# Patient Record
Sex: FEMALE | Race: WHITE | HISPANIC OR LATINO | Employment: FULL TIME | URBAN - METROPOLITAN AREA
[De-identification: names, ages, dates, MRNs, and addresses within clinical notes are randomized per-mention and may not be internally consistent; named-entity substitution may affect disease eponyms.]

---

## 2019-05-01 ENCOUNTER — TELEPHONE (OUTPATIENT)
Dept: SCHEDULING | Facility: IMAGING CENTER | Age: 29
End: 2019-05-01

## 2019-05-14 ENCOUNTER — OFFICE VISIT (OUTPATIENT)
Dept: MEDICAL GROUP | Facility: MEDICAL CENTER | Age: 29
End: 2019-05-14
Payer: COMMERCIAL

## 2019-05-14 VITALS
WEIGHT: 139.11 LBS | TEMPERATURE: 98.5 F | DIASTOLIC BLOOD PRESSURE: 59 MMHG | BODY MASS INDEX: 28.04 KG/M2 | HEART RATE: 89 BPM | HEIGHT: 59 IN | OXYGEN SATURATION: 95 % | SYSTOLIC BLOOD PRESSURE: 96 MMHG

## 2019-05-14 DIAGNOSIS — Z30.09 FAMILY PLANNING: ICD-10-CM

## 2019-05-14 DIAGNOSIS — Z00.00 WELL ADULT EXAM: ICD-10-CM

## 2019-05-14 PROCEDURE — 99385 PREV VISIT NEW AGE 18-39: CPT | Performed by: FAMILY MEDICINE

## 2019-05-14 ASSESSMENT — PATIENT HEALTH QUESTIONNAIRE - PHQ9: CLINICAL INTERPRETATION OF PHQ2 SCORE: 0

## 2019-05-16 NOTE — PROGRESS NOTES
Subjective:     CC:   Chief Complaint   Patient presents with   • Establish Care     Discuss family planning, going to [place], concerns of [place]       HPI:   Jarret Vargas is a 29 y.o. female who presents for annual exam. She is feeling well and denies any complaints.  Patient recently moved here from [place].  Patient also has questions about traveling to [place] and she is trying to conceive, with concerns about Zika virus.    Ob-Gyn/ History:    Patient has GYN provider: no  /Para:  0/0  Last Pap Smear: Up-to-date, no history of abnormal Pap smears.  Gyn Surgery: No.  Birth Control: none  Periods: Regular  No significant bloating/fluid retention, pelvic pain, or dyspareunia. No vaginal discharge  Folate intake: Yes   Sexually activity: Yes  Dyspareunia: No    Health Maintenance  Diet: Good   Exercise: Not currently   Substance Abuse: N/A   Safe in relationship.  Yes  STI Screening: N/A   Immunizations:    Influenza: Season ended   HPV: Aged out    Tetanus: Due, however, plans to get pregnant so we will get Tdap during last trimester.     Seat belts, bike helmet, gun safety discussed.  Sun protection used.    Cancer screening  Breast cancer: No family history of breast cancer, uterine or ovarian cancer  Colorectal Cancer Screening: No family history of colon cancer.      She  has no past medical history on file.  She  has a past surgical history that includes dental extraction(s).    Family History   Problem Relation Age of Onset   • Hypertension Mother    • Arthritis Father    • No Known Problems Sister    • No Known Problems Brother    • Cancer Maternal Grandmother         prostate   • Heart Disease Maternal Grandmother    • Heart Disease Maternal Grandfather        Social History     Social History   • Marital status:      Spouse name: N/A   • Number of children: N/A   • Years of education: N/A     Occupational History   • Not on file.     Social History Main Topics   • Smoking status: Never  "Smoker   • Smokeless tobacco: Never Used   • Alcohol use Yes      Comment: few nights a week   • Drug use: No   • Sexual activity: Yes     Partners: Male      Comment: ,      Other Topics Concern   • Not on file     Social History Narrative   • No narrative on file       Patient Active Problem List    Diagnosis Date Noted   • Family planning 05/14/2019         Current Outpatient Prescriptions   Medication Sig Dispense Refill   • [id number] PO Take  by mouth.       No current facility-administered medications for this visit.      Allergies   Allergen Reactions   • Other Misc      Cats       Review of Systems   Constitutional: Negative for fever, chills and malaise/fatigue.   HENT: Negative for congestion, sore throat, headache.    Eyes: Negative for double vision   Respiratory: Negative for cough and shortness of breath.    Cardiovascular: chest pain or palpitations  Gastrointestinal: Negative for nausea, vomiting, abdominal pain and diarrhea.   Genitourinary: Negative for dysuria and hematuria.   Skin: Negative for rash.   Neurological: Negative for dizziness, focal weakness and headaches.   Endo/Heme/Allergies: Does not bruise/bleed easily.   Psychiatric/Behavioral: Negative for depression or anxiety    Objective:     BP (!) 96/59 (BP Location: Right arm, Patient Position: Sitting, BP Cuff Size: Adult)   Pulse 89   Temp 36.9 °C (98.5 °F) (Temporal)   Ht 1.499 m (4' 11\")   Wt 63.1 kg (139 lb 1.8 oz)   LMP 05/14/2019   SpO2 95%   Breastfeeding? No   BMI 28.10 kg/m²   Body mass index is 28.1 kg/m².  Wt Readings from Last 4 Encounters:   05/14/19 63.1 kg (139 lb 1.8 oz)       Physical Exam:  Constitutional: Well-developed and well-nourished. Not diaphoretic. No distress.   Skin: Skin is warm and dry. No rash noted.  Eyes: Conjunctivae and extraocular motions are normal. Pupils are equal, round, and reactive to light. No scleral icterus.   Ears:  External ears unremarkable. Tympanic " membranes clear and intact.  Nose: Nares patent. No discharge.   Mouth/Throat: Dentition is normal. Tongue normal. Oropharynx is clear and moist. Posterior pharynx without erythema or exudates.  Neck: Supple, trachea midline. Normal range of motion. No thyromegaly present. No lymphadenopathy--cervical or supraclavicular.  Cardiovascular: Regular rate and rhythm, S1 and S2 without murmur, rubs, or gallops.    Lungs: Normal inspiratory effort, CTA bilaterally, no wheezes/rhonchi/rales  Abdomen: Soft, non tender, and without distention. Active bowel sounds in all four quadrants. No rebound, guarding, masses or HSM.  Extremities: No cyanosis, clubbing, erythema, nor edema. Distal pulses intact and symmetric.   Musculoskeletal: Normal gait and station.  Neurological: No focal deficits  Psychiatric:  Behavior, mood, and affect are appropriate.    Assessment and Plan:     1. Well adult exam     2. Family planning         HCM:     Up-to-date on immunizations aside from Tdap, plans to get this when she is pregnant.  Patient counseled about skin care, diet, supplements, prenatal vitamins, safe sex and exercise.  We did discuss traveling to [place].  I advised to avoid getting pregnant for the next 3 months after returning from [place] if her or her  receive any mosquito bites.    Please note that this dictation was created using voice recognition software. I have made every reasonable attempt to correct obvious errors, but I expect that there are errors of grammar and possibly content that I did not discover before finalizing the note.

## 2020-02-19 ENCOUNTER — APPOINTMENT (OUTPATIENT)
Dept: OTHER | Facility: IMAGING CENTER | Age: 30
End: 2020-02-19
Payer: COMMERCIAL

## 2020-03-02 ENCOUNTER — OFFICE VISIT (OUTPATIENT)
Dept: MEDICAL GROUP | Facility: MEDICAL CENTER | Age: 30
End: 2020-03-02
Payer: COMMERCIAL

## 2020-03-02 VITALS
SYSTOLIC BLOOD PRESSURE: 102 MMHG | OXYGEN SATURATION: 95 % | WEIGHT: 149.4 LBS | HEART RATE: 111 BPM | HEIGHT: 59 IN | BODY MASS INDEX: 30.12 KG/M2 | DIASTOLIC BLOOD PRESSURE: 58 MMHG | TEMPERATURE: 97.4 F

## 2020-03-02 DIAGNOSIS — R05.9 COUGH: ICD-10-CM

## 2020-03-02 PROCEDURE — 99213 OFFICE O/P EST LOW 20 MIN: CPT | Performed by: FAMILY MEDICINE

## 2020-03-02 ASSESSMENT — PATIENT HEALTH QUESTIONNAIRE - PHQ9: CLINICAL INTERPRETATION OF PHQ2 SCORE: 0

## 2020-03-02 NOTE — LETTER
[facility]  Tina Monroe M.D.  [address]  [place] [address]  Fax: [phone]   Authorization for Release/Disclosure of   Protected Health Information   Name: JARRET NICOLE : 1990 SSN: [id number]   Address: [address]  [place] [address] Phone:    [phone] (home)    I authorize the entity listed below to release/disclose the PHI below to:   [facility]/Tina Monroe M.D. and Tina Monroe M.D.   Provider or Entity Name: [facility]      Address   City, State, Zip   Phone:      Fax:     Reason for request: continuity of care   Information to be released:    [  ] LAST COLONOSCOPY,  including any PATH REPORT and follow-up  [  ] LAST FIT/COLOGUARD RESULT [  ] LAST DEXA  [  ] LAST MAMMOGRAM  [xxxx  ] LAST PAP  [  ] LAST LABS [  ] RETINA EXAM REPORT  [  ] IMMUNIZATION RECORDS  [  ] Release all info      [  ] Check here and initial the line next to each item to release ALL health information INCLUDING  _____ Care and treatment for drug and / or alcohol abuse  _____ HIV testing, infection status, or AIDS  _____ Genetic Testing    DATES OF SERVICE OR TIME PERIOD TO BE DISCLOSED: _____________  I understand and acknowledge that:  * This Authorization may be revoked at any time by you in writing, except if your health information has already been used or disclosed.  * Your health information that will be used or disclosed as a result of you signing this authorization could be re-disclosed by the recipient. If this occurs, your re-disclosed health information may no longer be protected by State or Federal laws.  * You may refuse to sign this Authorization. Your refusal will not affect your ability to obtain treatment.  * This Authorization becomes effective upon signing and will  on (date) __________.      If no date is indicated, this Authorization will  one (1) year from the signature date.    Name: Jarret Nicole    Signature:   Date:     3/2/2020       PLEASE FAX REQUESTED RECORDS BACK TO: [phone]

## 2020-03-02 NOTE — ASSESSMENT & PLAN NOTE
New problem. Started 1 week ago.   Cough, congestion, sore throat, headachess,   No further body aches.   Currently 5 months pregnant.   + fetal movement.   No fevers.   Currently taking tylenol and benadryl and robitussin.   + diarrhea, no e

## 2020-03-02 NOTE — PROGRESS NOTES
"Subjective:     CC: The encounter diagnosis was Cough.    HPI: Patient is a 30 y.o. female established patient who presents today with concern regarding illness.  Patient is currently 5 months pregnant.      Cough  New problem. Started 1 week ago.   Cough, congestion, sore throat, headachess,   No further body aches.  Seems to be improving now.  Currently 5 months pregnant.   + fetal movement.   No fevers.   Currently taking tylenol, benadryl and robitussin which were all approved by her OB.  She did have significant diarrhea yesterday, no further diarrhea today.      History reviewed. No pertinent past medical history.    Social History     Tobacco Use   • Smoking status: Never Smoker   • Smokeless tobacco: Never Used   Substance Use Topics   • Alcohol use: Yes     Comment: few nights a week   • Drug use: No       Current Outpatient Medications Ordered in Epic   Medication Sig Dispense Refill   • [id number] PO Take  by mouth.       No current Epic-ordered facility-administered medications on file.        Allergies:  Other [facility]    Health Maintenance: Completed    ROS:  Pulm: no sob, no cough  CV: no chest pain, no palpitations        Objective:       Exam:  /58 (BP Location: Right arm, Patient Position: Sitting, BP Cuff Size: Adult long)   Pulse (!) 111   Temp 36.3 °C (97.4 °F) (Temporal)   Ht 1.499 m (4' 11\")   Wt 67.8 kg (149 lb 6.4 oz)   LMP 10/12/2019   SpO2 95%   BMI 30.18 kg/m²  Body mass index is 30.18 kg/m².      General: Normal appearing. No distress.  HEAD: NCAT  EYES: conjunctiva clear, lids without ptosis, pupils equal  and reactive to light  EARS: ears normal shape and contour, canals are clear bilaterally, TMs clear  MOUTH: oropharynx is without erythema, edema or exudates.   Neck: Supple without masses. Thyroid is not enlarged. Normal ROM  Pulmonary: Clear to ausculation.  Normal effort. No rales, ronchi, or wheezing.  Cardiovascular: Regular rate and rhythm, no murmur. No LE " edema  Neurologic: Grossly normal, no focal deficits  Lymph: No cervical or supraclavicular lymph nodes are palpable  Skin: Warm and dry.  No obvious lesions.  Musculoskeletal: Normal gait and station.   Psych: Normal mood and affect. Alert and oriented x3. Judgment and insight is normal.    Assessment & Plan:     30 y.o. female with the following -     1. Cough  New problem.  Likely viral URI.  Discussed consideration of testing for influenza, however, given that symptoms started at least 5 days ago there would be no role for antiviral treatment.  I advised supportive care and to continue current medications as were advised by her OB.  I also advised trying nasal saline rinses to help with her eustachian tube dysfunction.  Discussed return precautions.  Advised the importance of keeping hydrated.      No follow-ups on file.    Please note that this dictation was created using voice recognition software. I have made every reasonable attempt to correct obvious errors, but I expect that there are errors of grammar and possibly content that I did not discover before finalizing the note.

## 2021-01-21 ENCOUNTER — OFFICE VISIT (OUTPATIENT)
Dept: MEDICAL GROUP | Facility: PHYSICIAN GROUP | Age: 31
End: 2021-01-21
Payer: COMMERCIAL

## 2021-01-21 VITALS
OXYGEN SATURATION: 98 % | HEIGHT: 59 IN | SYSTOLIC BLOOD PRESSURE: 102 MMHG | HEART RATE: 62 BPM | WEIGHT: 142 LBS | DIASTOLIC BLOOD PRESSURE: 66 MMHG | BODY MASS INDEX: 28.63 KG/M2 | TEMPERATURE: 97.9 F

## 2021-01-21 DIAGNOSIS — Z23 NEED FOR VACCINATION: ICD-10-CM

## 2021-01-21 DIAGNOSIS — R53.82 CHRONIC FATIGUE: ICD-10-CM

## 2021-01-21 DIAGNOSIS — G62.9 NEUROPATHY: ICD-10-CM

## 2021-01-21 DIAGNOSIS — Z13.228 SCREENING FOR METABOLIC DISORDER: ICD-10-CM

## 2021-01-21 DIAGNOSIS — Z13.29 SCREENING FOR THYROID DISORDER: ICD-10-CM

## 2021-01-21 DIAGNOSIS — Z13.21 ENCOUNTER FOR VITAMIN DEFICIENCY SCREENING: ICD-10-CM

## 2021-01-21 DIAGNOSIS — E55.9 VITAMIN D DEFICIENCY: ICD-10-CM

## 2021-01-21 DIAGNOSIS — F41.8 POSTPARTUM ANXIETY: ICD-10-CM

## 2021-01-21 DIAGNOSIS — Z00.00 ROUTINE ADULT HEALTH MAINTENANCE: ICD-10-CM

## 2021-01-21 DIAGNOSIS — Z13.6 SCREENING FOR CARDIOVASCULAR CONDITION: ICD-10-CM

## 2021-01-21 PROBLEM — R05.9 COUGH: Status: RESOLVED | Noted: 2020-03-02 | Resolved: 2021-01-21

## 2021-01-21 PROBLEM — Z30.09 FAMILY PLANNING: Status: RESOLVED | Noted: 2019-05-14 | Resolved: 2021-01-21

## 2021-01-21 PROCEDURE — 90686 IIV4 VACC NO PRSV 0.5 ML IM: CPT | Performed by: NURSE PRACTITIONER

## 2021-01-21 PROCEDURE — 99214 OFFICE O/P EST MOD 30 MIN: CPT | Mod: 25 | Performed by: NURSE PRACTITIONER

## 2021-01-21 PROCEDURE — 90471 IMMUNIZATION ADMIN: CPT | Performed by: NURSE PRACTITIONER

## 2021-01-21 RX ORDER — CLONAZEPAM 0.5 MG/1
.5-1 TABLET, ORALLY DISINTEGRATING ORAL NIGHTLY PRN
Qty: 60 TAB | Refills: 0 | Status: SHIPPED | OUTPATIENT
Start: 2021-01-21 | End: 2021-02-20

## 2021-01-21 RX ORDER — SERTRALINE HYDROCHLORIDE 25 MG/1
25 TABLET, FILM COATED ORAL DAILY
COMMUNITY
End: 2022-05-10

## 2021-01-21 NOTE — PATIENT INSTRUCTIONS
After one week on sertraline, take two tabs at night, 50mg.  If you feel nauseous / headachey / dizzy, bump back down to one tablet    Keep me updated

## 2021-02-04 NOTE — PROGRESS NOTES
Chief Complaint   Patient presents with   • Depression         Subjective:     Jarret Vargas is a 30 y.o. female presenting with postpartum concerns.    Pt delivered first baby roughly 4 months ago.  Feels she had been doing well despite pandemic, new motherhood, and social isolation related to covid 19.    Unfortunately roughly one week ago, her child began doing spontaneous spasm / twitches during and after feedings.  Due to the frequency and severity of these, he was brought to the [facility] and worked up for seizure d/o.      Pt had to witness her child intubated for an MRI scan, have multiple IV sticks, and undergo EEG testing.      Since then, she feels she cannot sleep, eat, and is ruminating on the thought that her child is in danger.  Cries when she sees him.  Cannot put him to bed due to anxiety.  Has slept 4 hours in the past two days.  Minimal food intake.    Very supportive family and partner.   is here with her today.  An Aunt has come to help care for the baby while she is able to refocus on herself.    Has access to [employer] through work and getting established with a counselor.      Is still breastfeeding, but willing to take medication if necessary; considering pumping / dumping or gradual wean.    Reviewed chemical imbalance of postpartum depression / anxiety and the fight/flight cycle the body gets stuck in.  It is understandable that the trauma she witnessed lead to severe episode of PPD / PPA.    Discussed RID of sertraline and advised we start that.  Discussed possible SE including increased risk of SI within the first month of use.  Reviewed taper up schedule based on response.  Pt very medication naive, so low dose may be sufficient.    I do think she would benefit from a short term rx of benzodiazepine to help her sleep as the sertraline builds up to a therapeutic dose in the body.  RID below 5%. Discussed compounding effects of other sedating medications and advised she  "avoid etoh or other medications while on this.  No driving or do anything requirng focus while on this medication.  Advised her  / family take nighttime care over for the interim to allow her to regain a healthy nights rest.        Review of systems:      Denies chest pain, shortness of breath, sore throat, difficulty swallowing, new cough, dizziness, severe headache, altered cognition, changes in bowel or bladder habits, decreased sensation, decreased strength, numbness or tingling, rash or skin concerns, changes in vision, myalgias, painful or swollen lymph nodes.       Current Outpatient Medications:   •  Clonazepam 0.5 MG TABLET DISPERSIBLE, Take 1-2 Tabs by mouth at bedtime as needed (sleep) for up to 30 days., Disp: 60 Tab, Rfl: 0  •  sertraline (ZOLOFT) 25 MG tablet, Take 25 mg by mouth every day., Disp: , Rfl:   •  [id number] PO, Take  by mouth., Disp: , Rfl:     Allergies, past medical history, past surgical history, family history, social history reviewed and updated    Objective:     Vitals: /66 (BP Location: Left arm, Patient Position: Sitting, BP Cuff Size: Adult)   Pulse 62   Temp 36.6 °C (97.9 °F) (Temporal)   Ht 1.499 m (4' 11\")   Wt 64.4 kg (142 lb)   SpO2 98%   BMI 28.68 kg/m²   General: Alert, cooperative, dressed appropriately for weather / situation  Eyes:Normocephalic.  EOMI, no icterus or pallor.  Conjunctivae clear without erythema / irritation.  ENT:  External ears developed; Bilat TMs visualized; appear pearly without bulging, effusion, or erythema; crisp light reflex  Lymph: Neck supple, absent of cervical or supraclavicular lymphadenopathy.  Thyroid palpated, free of masses or goiter.   Heart: Regular rate and rhythm.  S1 and S2 normal.  No murmurs auscultated; no murmurs / bruits heard over bilateral carotids.  Bilateral radial pulses strong and equal.  Bilateral posterior tibial pulses strong and equal.  Respiratory: Normal respiratory effort.  Clear to " auscultation bilaterally.  AP ratio 1:2   Abdomen: Non-distended;   Skin: Visible skin intact, dry, without rash.  Musculoskeletal: Gait is normal.  Bilateral  strength strong equal.  Moves extremities freely and equally bilaterally  Neuro:  AAOx3 Visual tracking intact, no nystagmus;   Psych:  Affect/mood is normal, judgement is good, memory is intact, grooming is appropriate.    Assessment/Plan:     Jarret was seen today for depression.    Diagnoses and all orders for this visit:    Neuropathy  -     CBC WITH DIFFERENTIAL; Future  -     Comp Metabolic Panel; Future  -     TSH WITH REFLEX TO FT4; Future  -     VITAMIN B12; Future    Routine adult health maintenance  -     Influenza Vaccine Quad Injection (PF)  -     CBC WITH DIFFERENTIAL; Future  -     Comp Metabolic Panel; Future  -     Lipid Profile; Future  -     VITAMIN D,25 HYDROXY; Future  -     TSH WITH REFLEX TO FT4; Future  -     VITAMIN B12; Future    Screening for cardiovascular condition  -     CBC WITH DIFFERENTIAL; Future  -     Lipid Profile; Future    Encounter for vitamin deficiency screening  -     CBC WITH DIFFERENTIAL; Future  -     Comp Metabolic Panel; Future  -     VITAMIN D,25 HYDROXY; Future  -     VITAMIN B12; Future    Screening for thyroid disorder  -     TSH WITH REFLEX TO FT4; Future    Screening for metabolic disorder  -     Comp Metabolic Panel; Future  -     Lipid Profile; Future    Vitamin D deficiency  -     VITAMIN D,25 HYDROXY; Future    Chronic fatigue  -     CBC WITH DIFFERENTIAL; Future  -     Comp Metabolic Panel; Future  -     VITAMIN D,25 HYDROXY; Future  -     TSH WITH REFLEX TO FT4; Future  -     VITAMIN B12; Future    Postpartum anxiety  -     Clonazepam 0.5 MG TABLET DISPERSIBLE; Take 1-2 Tabs by mouth at bedtime as needed (sleep) for up to 30 days.    Need for vaccination  -     Influenza Vaccine Quad Injection (PF)      In accordance with [place] Bill 474, this patient complies with all of the following:    -I have  made a good isha effort to review previous and current medical records    -Physical and psychological exam is been done including risk of abuse, mental health assessment.    -Current treatment plan available in patient's chart    -Alternative treatment options have been discussed, attempted, and found to be insufficient.    Potential risks and benefits reviewed at length including but not limited to risk of dependency, addiction, overdose    -Controlled substance agreement reviewed and completed along with a urine drug screen yearly.    -Discussed common side effects and mitigation strategies of them.    -PDMP reviewed    -Advised to avoid use of any other sedative agents concurrently with controlled substances including prescriptions, alcohol, marijuana.      No follow-ups on file.    Patient verbalized understanding and agreed to plan of care.  Encouraged to contact me with needs via [employer] or by phone if needed.      I have placed the above orders and discussed them with an approved delegating provider.  The MA is performing the below orders under the direction of Dr Manzanares.    Please note that this dictation was created using voice recognition software. I have made every reasonable attempt to correct obvious errors, but I expect that there are errors of grammar and possibly content that I did not discover before finalizing the note.

## 2021-02-09 ENCOUNTER — HOSPITAL ENCOUNTER (OUTPATIENT)
Dept: LAB | Facility: MEDICAL CENTER | Age: 31
End: 2021-02-09
Attending: NURSE PRACTITIONER
Payer: COMMERCIAL

## 2021-02-09 DIAGNOSIS — Z13.29 SCREENING FOR THYROID DISORDER: ICD-10-CM

## 2021-02-09 DIAGNOSIS — R53.82 CHRONIC FATIGUE: ICD-10-CM

## 2021-02-09 DIAGNOSIS — Z13.6 SCREENING FOR CARDIOVASCULAR CONDITION: ICD-10-CM

## 2021-02-09 DIAGNOSIS — Z13.21 ENCOUNTER FOR VITAMIN DEFICIENCY SCREENING: ICD-10-CM

## 2021-02-09 DIAGNOSIS — E55.9 VITAMIN D DEFICIENCY: ICD-10-CM

## 2021-02-09 DIAGNOSIS — Z00.00 ROUTINE ADULT HEALTH MAINTENANCE: ICD-10-CM

## 2021-02-09 DIAGNOSIS — Z13.228 SCREENING FOR METABOLIC DISORDER: ICD-10-CM

## 2021-02-09 DIAGNOSIS — G62.9 NEUROPATHY: ICD-10-CM

## 2021-02-09 LAB
25(OH)D3 SERPL-MCNC: 29 NG/ML (ref 30–100)
BASOPHILS # BLD AUTO: 0.4 % (ref 0–1.8)
BASOPHILS # BLD: 0.03 K/UL (ref 0–0.12)
EOSINOPHIL # BLD AUTO: 0.05 K/UL (ref 0–0.51)
EOSINOPHIL NFR BLD: 0.6 % (ref 0–6.9)
ERYTHROCYTE [DISTWIDTH] IN BLOOD BY AUTOMATED COUNT: 46.6 FL (ref 35.9–50)
HCT VFR BLD AUTO: 46.3 % (ref 37–47)
HGB BLD-MCNC: 14.5 G/DL (ref 12–16)
IMM GRANULOCYTES # BLD AUTO: 0.02 K/UL (ref 0–0.11)
IMM GRANULOCYTES NFR BLD AUTO: 0.3 % (ref 0–0.9)
LYMPHOCYTES # BLD AUTO: 2.44 K/UL (ref 1–4.8)
LYMPHOCYTES NFR BLD: 31.1 % (ref 22–41)
MCH RBC QN AUTO: 27.6 PG (ref 27–33)
MCHC RBC AUTO-ENTMCNC: 31.3 G/DL (ref 33.6–35)
MCV RBC AUTO: 88.2 FL (ref 81.4–97.8)
MONOCYTES # BLD AUTO: 0.47 K/UL (ref 0–0.85)
MONOCYTES NFR BLD AUTO: 6 % (ref 0–13.4)
NEUTROPHILS # BLD AUTO: 4.83 K/UL (ref 2–7.15)
NEUTROPHILS NFR BLD: 61.6 % (ref 44–72)
NRBC # BLD AUTO: 0 K/UL
NRBC BLD-RTO: 0 /100 WBC
PLATELET # BLD AUTO: 287 K/UL (ref 164–446)
PMV BLD AUTO: 11.3 FL (ref 9–12.9)
RBC # BLD AUTO: 5.25 M/UL (ref 4.2–5.4)
TSH SERPL DL<=0.005 MIU/L-ACNC: 1.55 UIU/ML (ref 0.38–5.33)
VIT B12 SERPL-MCNC: 678 PG/ML (ref 211–911)
WBC # BLD AUTO: 7.8 K/UL (ref 4.8–10.8)

## 2021-02-09 PROCEDURE — 82306 VITAMIN D 25 HYDROXY: CPT

## 2021-02-09 PROCEDURE — 82607 VITAMIN B-12: CPT

## 2021-02-09 PROCEDURE — 84443 ASSAY THYROID STIM HORMONE: CPT

## 2021-02-09 PROCEDURE — 36415 COLL VENOUS BLD VENIPUNCTURE: CPT

## 2021-02-09 PROCEDURE — 85025 COMPLETE CBC W/AUTO DIFF WBC: CPT

## 2021-02-19 ENCOUNTER — HOSPITAL ENCOUNTER (OUTPATIENT)
Dept: LAB | Facility: MEDICAL CENTER | Age: 31
End: 2021-02-19
Attending: NURSE PRACTITIONER
Payer: COMMERCIAL

## 2021-02-19 LAB
ALBUMIN SERPL BCP-MCNC: 4.6 G/DL (ref 3.2–4.9)
ALBUMIN/GLOB SERPL: 1.6 G/DL
ALP SERPL-CCNC: 94 U/L (ref 30–99)
ALT SERPL-CCNC: 30 U/L (ref 2–50)
ANION GAP SERPL CALC-SCNC: 11 MMOL/L (ref 7–16)
AST SERPL-CCNC: 21 U/L (ref 12–45)
BILIRUB SERPL-MCNC: 0.4 MG/DL (ref 0.1–1.5)
BUN SERPL-MCNC: 9 MG/DL (ref 8–22)
CALCIUM SERPL-MCNC: 9.7 MG/DL (ref 8.5–10.5)
CHLORIDE SERPL-SCNC: 101 MMOL/L (ref 96–112)
CHOLEST SERPL-MCNC: 201 MG/DL (ref 100–199)
CO2 SERPL-SCNC: 24 MMOL/L (ref 20–33)
CREAT SERPL-MCNC: 0.73 MG/DL (ref 0.5–1.4)
FASTING STATUS PATIENT QL REPORTED: NORMAL
GLOBULIN SER CALC-MCNC: 2.9 G/DL (ref 1.9–3.5)
GLUCOSE SERPL-MCNC: 81 MG/DL (ref 65–99)
HDLC SERPL-MCNC: 49 MG/DL
LDLC SERPL CALC-MCNC: 128 MG/DL
POTASSIUM SERPL-SCNC: 4.2 MMOL/L (ref 3.6–5.5)
PROT SERPL-MCNC: 7.5 G/DL (ref 6–8.2)
SODIUM SERPL-SCNC: 136 MMOL/L (ref 135–145)
TRIGL SERPL-MCNC: 121 MG/DL (ref 0–149)

## 2021-02-19 PROCEDURE — 80061 LIPID PANEL: CPT

## 2021-02-19 PROCEDURE — 80053 COMPREHEN METABOLIC PANEL: CPT

## 2021-02-19 PROCEDURE — 36415 COLL VENOUS BLD VENIPUNCTURE: CPT

## 2021-02-25 ENCOUNTER — OFFICE VISIT (OUTPATIENT)
Dept: MEDICAL GROUP | Facility: PHYSICIAN GROUP | Age: 31
End: 2021-02-25
Payer: COMMERCIAL

## 2021-02-25 VITALS
HEART RATE: 88 BPM | OXYGEN SATURATION: 97 % | DIASTOLIC BLOOD PRESSURE: 66 MMHG | TEMPERATURE: 98.7 F | BODY MASS INDEX: 28.02 KG/M2 | WEIGHT: 139 LBS | HEIGHT: 59 IN | SYSTOLIC BLOOD PRESSURE: 112 MMHG

## 2021-02-25 DIAGNOSIS — E55.9 VITAMIN D DEFICIENCY: ICD-10-CM

## 2021-02-25 DIAGNOSIS — F41.8 POSTPARTUM ANXIETY: ICD-10-CM

## 2021-02-25 PROCEDURE — 99213 OFFICE O/P EST LOW 20 MIN: CPT | Performed by: NURSE PRACTITIONER

## 2021-02-25 ASSESSMENT — FIBROSIS 4 INDEX: FIB4 SCORE: 0.43

## 2021-02-25 ASSESSMENT — PATIENT HEALTH QUESTIONNAIRE - PHQ9: CLINICAL INTERPRETATION OF PHQ2 SCORE: 0

## 2021-02-25 NOTE — PROGRESS NOTES
"Chief Complaint   Patient presents with   • Follow-Up         Subjective:     Jarret Vargas is a 30 y.o. female presenting for follow-up.    Anxiety: Much improved since last visit.  Patient has continued the sertraline 25 mg however is now taking it daily.  Rarely uses clonazepam for sleep.  Appetite has returned and she feels like she is able to function and care for her baby.    Vitamin D deficiency: New issue.  Has not been taking any supplements or vitamins since completing breast-feeding.  Amenable to weekly high-dose vitamin D.      Review of systems:      Denies chest pain, shortness of breath, sore throat, difficulty swallowing, new cough, dizziness, severe headache, altered cognition, changes in bowel or bladder habits, decreased sensation, decreased strength, numbness or tingling, intolerable depression or anxiety, rash or skin concerns, changes in vision, fatigue, myalgias, painful or swollen lymph nodes.       Current Outpatient Medications:   •  Cholecalciferol 1.25 MG (27141 UT) Tab, Take 50,000 Units by mouth every 7 days., Disp: 12 tablet, Rfl: 0  •  sertraline (ZOLOFT) 25 MG tablet, Take 25 mg by mouth every day., Disp: , Rfl:     Allergies, past medical history, past surgical history, family history, social history reviewed and updated    Objective:     Vitals: /66 (BP Location: Right arm, Patient Position: Sitting, BP Cuff Size: Adult)   Pulse 88   Temp 37.1 °C (98.7 °F) (Temporal)   Ht 1.499 m (4' 11\")   Wt 63 kg (139 lb)   SpO2 97%   BMI 28.07 kg/m²   Constitutional: Alert, no distress, well-groomed.  Skin: Warm, dry, good turgor, no rashes in visible areas.  Eye: Equal, round and reactive, conjunctiva clear, lids normal.  ENMT: Lips without lesions, good dentition, moist mucous membranes.  Neck: Trachea midline, no masses, no thyromegaly.  Respiratory: Unlabored respiratory effort, no cough.  MSK: Normal gait, moves all extremities.  Neuro: Grossly non-focal.   Psych: Alert and " oriented x3, normal affect and mood.    Assessment/Plan:     Jarret was seen today for follow-up.    Diagnoses and all orders for this visit:    Vitamin D deficiency  Comments:  Start high-dose vitamin D weekly.  Advised that after 3 weeks, take 2000 units daily for maintenance.  Orders:  -     Cholecalciferol 1.25 MG (07188 UT) Tab; Take 50,000 Units by mouth every 7 days.    Postpartum anxiety  Comments:  Recovering quite well, sleeping better with notably less disturbance in ADL.  Tapering off sertraline successfully.  Continues therapy and has good social suppo          Return in about 1 year (around 2/25/2022).    Patient verbalized understanding and agreed to plan of care.  Encouraged to contact me with needs via [employer] or by phone if needed.      I have placed the above orders and discussed them with an approved delegating provider.  The MA is performing the below orders under the direction of Dr Manzanares.    Please note that this dictation was created using voice recognition software. I have made every reasonable attempt to correct obvious errors, but I expect that there are errors of grammar and possibly content that I did not discover before finalizing the note.

## 2021-05-14 DIAGNOSIS — E55.9 VITAMIN D DEFICIENCY: ICD-10-CM

## 2021-05-17 RX ORDER — CHOLECALCIFEROL (VITAMIN D3) 1250 MCG
CAPSULE ORAL
OUTPATIENT
Start: 2021-05-17

## 2022-05-10 ENCOUNTER — OFFICE VISIT (OUTPATIENT)
Dept: MEDICAL GROUP | Facility: MEDICAL CENTER | Age: 32
End: 2022-05-10
Payer: COMMERCIAL

## 2022-05-10 VITALS
SYSTOLIC BLOOD PRESSURE: 94 MMHG | TEMPERATURE: 97.6 F | BODY MASS INDEX: 30.72 KG/M2 | DIASTOLIC BLOOD PRESSURE: 56 MMHG | HEART RATE: 92 BPM | OXYGEN SATURATION: 96 % | HEIGHT: 59 IN | RESPIRATION RATE: 16 BRPM | WEIGHT: 152.4 LBS

## 2022-05-10 DIAGNOSIS — Z76.89 ENCOUNTER TO ESTABLISH CARE: ICD-10-CM

## 2022-05-10 DIAGNOSIS — Z3A.16 16 WEEKS GESTATION OF PREGNANCY: ICD-10-CM

## 2022-05-10 PROCEDURE — 99212 OFFICE O/P EST SF 10 MIN: CPT | Performed by: PHYSICIAN ASSISTANT

## 2022-05-10 ASSESSMENT — PATIENT HEALTH QUESTIONNAIRE - PHQ9: CLINICAL INTERPRETATION OF PHQ2 SCORE: 0

## 2022-05-10 ASSESSMENT — FIBROSIS 4 INDEX: FIB4 SCORE: 0.45

## 2022-05-10 NOTE — PROGRESS NOTES
"Subjective:   CC:   Chief Complaint   Patient presents with   • Establish Care       Jarret Vargas is a 32 y.o. female here today for establishing care.    Patient is  16 weeks pregnant, uneventful pregnancy without any vaginal bleed.  Continues on prenatals and vitamin D supplement.  Has an OB/GYN.          Current medicines (including changes today)  Current Outpatient Medications   Medication Sig Dispense Refill   • Prenatal Multivit-Min-Fe-FA (PRE-TONI FORMULA PO) Take  by mouth.     • Cholecalciferol 1.25 MG (69355 UT) Tab Take 50,000 Units by mouth every 7 days. (Patient not taking: Reported on 5/10/2022) 12 tablet 0     No current facility-administered medications for this visit.         Past medical, surgical, family, and social history are reviewed in Epic chart by me today.   Medications and allergies reviewed in Epic chart by me today.         ROS     As documented in history of present illness above     Objective:     BP (!) 94/56 (BP Location: Left arm, Patient Position: Sitting)   Pulse 92   Temp 36.4 °C (97.6 °F) (Temporal)   Resp 16   Ht 1.499 m (4' 11\")   Wt 69.1 kg (152 lb 6.4 oz)   SpO2 96%  Body mass index is 30.78 kg/m².  Physical Exam:  Constitutional: Alert, oriented in no acute distress.  Psych: Eye contact is good, speech goal directed, affect calm  Eyes: Conjunctiva non-injected, sclera non-icteric.  Ears:  External ears unremarkable. TMs pearly gray, clear and intact, w/o any perforation or effusion.  Neck: No cervical or supraclavicular lymphadenopathy,Trachea midline, no thyromegaly, no masses  Lungs: Unlabored respiratory effort, clear to auscultation bilaterally with good excursion, no wheez or rhonci  CV: regular rate and rhythm. No lower extremity edema        Assessment and Plan:   The following treatment plan was discussed    1. Encounter to establish care      2. 16 weeks gestation of pregnancy        Followup:  Prn          Please note that this dictation was created " using voice recognition software. I have made every reasonable attempt to correct obvious errors, but I expect that there are errors of grammar and possibly content that I did not discover before finalizing the note.

## 2022-08-17 ENCOUNTER — APPOINTMENT (OUTPATIENT)
Dept: OBGYN | Facility: MEDICAL CENTER | Age: 32
End: 2022-08-17
Attending: OBSTETRICS & GYNECOLOGY
Payer: COMMERCIAL

## 2022-08-17 ENCOUNTER — HOSPITAL ENCOUNTER (INPATIENT)
Facility: MEDICAL CENTER | Age: 32
LOS: 2 days | End: 2022-08-19
Attending: OBSTETRICS & GYNECOLOGY | Admitting: OBSTETRICS & GYNECOLOGY
Payer: COMMERCIAL

## 2022-08-17 ENCOUNTER — ANESTHESIA EVENT (OUTPATIENT)
Dept: ANESTHESIOLOGY | Facility: MEDICAL CENTER | Age: 32
End: 2022-08-17
Payer: COMMERCIAL

## 2022-08-17 ENCOUNTER — ANESTHESIA (OUTPATIENT)
Dept: ANESTHESIOLOGY | Facility: MEDICAL CENTER | Age: 32
End: 2022-08-17
Payer: COMMERCIAL

## 2022-08-17 PROBLEM — Z34.90 ENCOUNTER FOR INDUCTION OF LABOR: Status: ACTIVE | Noted: 2022-08-17

## 2022-08-17 LAB
BASOPHILS # BLD AUTO: 0 % (ref 0–1.8)
BASOPHILS # BLD: 0 K/UL (ref 0–0.12)
EOSINOPHIL # BLD AUTO: 0.08 K/UL (ref 0–0.51)
EOSINOPHIL NFR BLD: 0.9 % (ref 0–6.9)
ERYTHROCYTE [DISTWIDTH] IN BLOOD BY AUTOMATED COUNT: 44.7 FL (ref 35.9–50)
HCT VFR BLD AUTO: 34.3 % (ref 37–47)
HGB BLD-MCNC: 11.7 G/DL (ref 12–16)
HOLDING TUBE BB 8507: NORMAL
LYMPHOCYTES # BLD AUTO: 3.51 K/UL (ref 1–4.8)
LYMPHOCYTES NFR BLD: 40.8 % (ref 22–41)
MANUAL DIFF BLD: NORMAL
MCH RBC QN AUTO: 30.2 PG (ref 27–33)
MCHC RBC AUTO-ENTMCNC: 34.1 G/DL (ref 33.6–35)
MCV RBC AUTO: 88.4 FL (ref 81.4–97.8)
METAMYELOCYTES NFR BLD MANUAL: 0.9 %
MONOCYTES # BLD AUTO: 0.4 K/UL (ref 0–0.85)
MONOCYTES NFR BLD AUTO: 4.6 % (ref 0–13.4)
MORPHOLOGY BLD-IMP: NORMAL
NEUTROPHILS # BLD AUTO: 4.54 K/UL (ref 2–7.15)
NEUTROPHILS NFR BLD: 52.8 % (ref 44–72)
NRBC # BLD AUTO: 0 K/UL
NRBC BLD-RTO: 0 /100 WBC
PLATELET # BLD AUTO: 166 K/UL (ref 164–446)
PLATELET BLD QL SMEAR: NORMAL
PMV BLD AUTO: 10.5 FL (ref 9–12.9)
RBC # BLD AUTO: 3.88 M/UL (ref 4.2–5.4)
RBC BLD AUTO: PRESENT
SMUDGE CELLS BLD QL SMEAR: NORMAL
WBC # BLD AUTO: 8.6 K/UL (ref 4.8–10.8)

## 2022-08-17 PROCEDURE — 10907ZC DRAINAGE OF AMNIOTIC FLUID, THERAPEUTIC FROM PRODUCTS OF CONCEPTION, VIA NATURAL OR ARTIFICIAL OPENING: ICD-10-PCS | Performed by: OBSTETRICS & GYNECOLOGY

## 2022-08-17 PROCEDURE — 700105 HCHG RX REV CODE 258: Performed by: OBSTETRICS & GYNECOLOGY

## 2022-08-17 PROCEDURE — 36415 COLL VENOUS BLD VENIPUNCTURE: CPT

## 2022-08-17 PROCEDURE — 700111 HCHG RX REV CODE 636 W/ 250 OVERRIDE (IP): Performed by: OBSTETRICS & GYNECOLOGY

## 2022-08-17 PROCEDURE — 3E033VJ INTRODUCTION OF OTHER HORMONE INTO PERIPHERAL VEIN, PERCUTANEOUS APPROACH: ICD-10-PCS | Performed by: OBSTETRICS & GYNECOLOGY

## 2022-08-17 PROCEDURE — 85025 COMPLETE CBC W/AUTO DIFF WBC: CPT

## 2022-08-17 PROCEDURE — 302449 STATCHG TRIAGE ONLY (STATISTIC)

## 2022-08-17 PROCEDURE — 303615 HCHG EPIDURAL/SPINAL ANESTHESIA FOR LABOR

## 2022-08-17 PROCEDURE — 59409 OBSTETRICAL CARE: CPT

## 2022-08-17 PROCEDURE — 700111 HCHG RX REV CODE 636 W/ 250 OVERRIDE (IP): Performed by: EMERGENCY MEDICINE

## 2022-08-17 PROCEDURE — 700101 HCHG RX REV CODE 250: Performed by: EMERGENCY MEDICINE

## 2022-08-17 PROCEDURE — 700102 HCHG RX REV CODE 250 W/ 637 OVERRIDE(OP): Performed by: OBSTETRICS & GYNECOLOGY

## 2022-08-17 PROCEDURE — 10H07YZ INSERTION OF OTHER DEVICE INTO PRODUCTS OF CONCEPTION, VIA NATURAL OR ARTIFICIAL OPENING: ICD-10-PCS | Performed by: OBSTETRICS & GYNECOLOGY

## 2022-08-17 PROCEDURE — 700111 HCHG RX REV CODE 636 W/ 250 OVERRIDE (IP)

## 2022-08-17 PROCEDURE — 770002 HCHG ROOM/CARE - OB PRIVATE (112)

## 2022-08-17 PROCEDURE — 01967 NEURAXL LBR ANES VAG DLVR: CPT | Performed by: EMERGENCY MEDICINE

## 2022-08-17 PROCEDURE — 304965 HCHG RECOVERY SERVICES

## 2022-08-17 PROCEDURE — A9270 NON-COVERED ITEM OR SERVICE: HCPCS | Performed by: OBSTETRICS & GYNECOLOGY

## 2022-08-17 PROCEDURE — 85007 BL SMEAR W/DIFF WBC COUNT: CPT

## 2022-08-17 RX ORDER — CARBOPROST TROMETHAMINE 250 UG/ML
250 INJECTION, SOLUTION INTRAMUSCULAR
Status: DISCONTINUED | OUTPATIENT
Start: 2022-08-17 | End: 2022-08-19 | Stop reason: HOSPADM

## 2022-08-17 RX ORDER — ONDANSETRON 2 MG/ML
4 INJECTION INTRAMUSCULAR; INTRAVENOUS EVERY 6 HOURS PRN
Status: DISCONTINUED | OUTPATIENT
Start: 2022-08-17 | End: 2022-08-17 | Stop reason: HOSPADM

## 2022-08-17 RX ORDER — ACETAMINOPHEN 500 MG
1000 TABLET ORAL
Status: COMPLETED | OUTPATIENT
Start: 2022-08-17 | End: 2022-08-17

## 2022-08-17 RX ORDER — SODIUM CHLORIDE, SODIUM LACTATE, POTASSIUM CHLORIDE, AND CALCIUM CHLORIDE .6; .31; .03; .02 G/100ML; G/100ML; G/100ML; G/100ML
1000 INJECTION, SOLUTION INTRAVENOUS
Status: DISCONTINUED | OUTPATIENT
Start: 2022-08-17 | End: 2022-08-17 | Stop reason: HOSPADM

## 2022-08-17 RX ORDER — SODIUM CHLORIDE, SODIUM LACTATE, POTASSIUM CHLORIDE, CALCIUM CHLORIDE 600; 310; 30; 20 MG/100ML; MG/100ML; MG/100ML; MG/100ML
INJECTION, SOLUTION INTRAVENOUS CONTINUOUS
Status: DISCONTINUED | OUTPATIENT
Start: 2022-08-17 | End: 2022-08-19 | Stop reason: HOSPADM

## 2022-08-17 RX ORDER — DOCUSATE SODIUM 100 MG/1
100 CAPSULE, LIQUID FILLED ORAL 2 TIMES DAILY PRN
Status: DISCONTINUED | OUTPATIENT
Start: 2022-08-17 | End: 2022-08-19 | Stop reason: HOSPADM

## 2022-08-17 RX ORDER — SODIUM CHLORIDE, SODIUM LACTATE, POTASSIUM CHLORIDE, CALCIUM CHLORIDE 600; 310; 30; 20 MG/100ML; MG/100ML; MG/100ML; MG/100ML
INJECTION, SOLUTION INTRAVENOUS PRN
Status: DISCONTINUED | OUTPATIENT
Start: 2022-08-17 | End: 2022-08-19 | Stop reason: HOSPADM

## 2022-08-17 RX ORDER — LIDOCAINE HYDROCHLORIDE 10 MG/ML
INJECTION, SOLUTION EPIDURAL; INFILTRATION; INTRACAUDAL; PERINEURAL
Status: ACTIVE
Start: 2022-08-17 | End: 2022-08-18

## 2022-08-17 RX ORDER — METHYLERGONOVINE MALEATE 0.2 MG/ML
0.2 INJECTION INTRAVENOUS
Status: DISCONTINUED | OUTPATIENT
Start: 2022-08-17 | End: 2022-08-19 | Stop reason: HOSPADM

## 2022-08-17 RX ORDER — ROPIVACAINE HYDROCHLORIDE 2 MG/ML
INJECTION, SOLUTION EPIDURAL; INFILTRATION PRN
Status: DISCONTINUED | OUTPATIENT
Start: 2022-08-17 | End: 2022-08-17 | Stop reason: HOSPADM

## 2022-08-17 RX ORDER — OXYTOCIN 10 [USP'U]/ML
10 INJECTION, SOLUTION INTRAMUSCULAR; INTRAVENOUS
Status: DISCONTINUED | OUTPATIENT
Start: 2022-08-17 | End: 2022-08-17 | Stop reason: HOSPADM

## 2022-08-17 RX ORDER — LIDOCAINE HYDROCHLORIDE AND EPINEPHRINE 15; 5 MG/ML; UG/ML
INJECTION, SOLUTION EPIDURAL
Status: COMPLETED | OUTPATIENT
Start: 2022-08-17 | End: 2022-08-17

## 2022-08-17 RX ORDER — IBUPROFEN 800 MG/1
800 TABLET ORAL EVERY 8 HOURS PRN
Status: DISCONTINUED | OUTPATIENT
Start: 2022-08-18 | End: 2022-08-19 | Stop reason: HOSPADM

## 2022-08-17 RX ORDER — ROPIVACAINE HYDROCHLORIDE 2 MG/ML
INJECTION, SOLUTION EPIDURAL; INFILTRATION; PERINEURAL
Status: COMPLETED
Start: 2022-08-17 | End: 2022-08-17

## 2022-08-17 RX ORDER — IBUPROFEN 800 MG/1
800 TABLET ORAL
Status: COMPLETED | OUTPATIENT
Start: 2022-08-17 | End: 2022-08-17

## 2022-08-17 RX ORDER — MISOPROSTOL 200 UG/1
600 TABLET ORAL
Status: DISCONTINUED | OUTPATIENT
Start: 2022-08-17 | End: 2022-08-19 | Stop reason: HOSPADM

## 2022-08-17 RX ORDER — ROPIVACAINE HYDROCHLORIDE 2 MG/ML
INJECTION, SOLUTION EPIDURAL; INFILTRATION; PERINEURAL CONTINUOUS
Status: DISCONTINUED | OUTPATIENT
Start: 2022-08-17 | End: 2022-08-19 | Stop reason: HOSPADM

## 2022-08-17 RX ORDER — SODIUM CHLORIDE, SODIUM LACTATE, POTASSIUM CHLORIDE, AND CALCIUM CHLORIDE .6; .31; .03; .02 G/100ML; G/100ML; G/100ML; G/100ML
250 INJECTION, SOLUTION INTRAVENOUS PRN
Status: DISCONTINUED | OUTPATIENT
Start: 2022-08-17 | End: 2022-08-17 | Stop reason: HOSPADM

## 2022-08-17 RX ORDER — ONDANSETRON 4 MG/1
4 TABLET, ORALLY DISINTEGRATING ORAL EVERY 6 HOURS PRN
Status: DISCONTINUED | OUTPATIENT
Start: 2022-08-17 | End: 2022-08-17 | Stop reason: HOSPADM

## 2022-08-17 RX ORDER — TERBUTALINE SULFATE 1 MG/ML
0.25 INJECTION, SOLUTION SUBCUTANEOUS
Status: DISCONTINUED | OUTPATIENT
Start: 2022-08-17 | End: 2022-08-17 | Stop reason: HOSPADM

## 2022-08-17 RX ORDER — ALUMINA, MAGNESIA, AND SIMETHICONE 2400; 2400; 240 MG/30ML; MG/30ML; MG/30ML
30 SUSPENSION ORAL EVERY 6 HOURS PRN
Status: DISCONTINUED | OUTPATIENT
Start: 2022-08-17 | End: 2022-08-17 | Stop reason: HOSPADM

## 2022-08-17 RX ORDER — OXYCODONE HYDROCHLORIDE 5 MG/1
5 TABLET ORAL EVERY 4 HOURS PRN
Status: DISCONTINUED | OUTPATIENT
Start: 2022-08-17 | End: 2022-08-19 | Stop reason: HOSPADM

## 2022-08-17 RX ORDER — ACETAMINOPHEN 500 MG
1000 TABLET ORAL EVERY 6 HOURS PRN
Status: DISCONTINUED | OUTPATIENT
Start: 2022-08-18 | End: 2022-08-19 | Stop reason: HOSPADM

## 2022-08-17 RX ADMIN — SODIUM CHLORIDE, POTASSIUM CHLORIDE, SODIUM LACTATE AND CALCIUM CHLORIDE: 600; 310; 30; 20 INJECTION, SOLUTION INTRAVENOUS at 05:24

## 2022-08-17 RX ADMIN — ROPIVACAINE HYDROCHLORIDE 200 MG: 2 INJECTION, SOLUTION EPIDURAL; INFILTRATION at 08:55

## 2022-08-17 RX ADMIN — EPHEDRINE SULFATE 5 MG: 50 INJECTION INTRAVENOUS at 09:39

## 2022-08-17 RX ADMIN — Medication 125 ML/HR: at 19:11

## 2022-08-17 RX ADMIN — ROPIVACAINE HYDROCHLORIDE 7 ML: 5 INJECTION, SOLUTION EPIDURAL; INFILTRATION; PERINEURAL at 09:00

## 2022-08-17 RX ADMIN — IBUPROFEN 800 MG: 800 TABLET, FILM COATED ORAL at 18:26

## 2022-08-17 RX ADMIN — ROPIVACAINE HYDROCHLORIDE 200 MG: 2 INJECTION, SOLUTION EPIDURAL; INFILTRATION; PERINEURAL at 08:55

## 2022-08-17 RX ADMIN — ONDANSETRON 4 MG: 2 INJECTION INTRAMUSCULAR; INTRAVENOUS at 09:30

## 2022-08-17 RX ADMIN — LIDOCAINE HYDROCHLORIDE,EPINEPHRINE BITARTRATE 3 ML: 15; .005 INJECTION, SOLUTION EPIDURAL; INFILTRATION; INTRACAUDAL; PERINEURAL at 08:42

## 2022-08-17 RX ADMIN — SODIUM CHLORIDE, POTASSIUM CHLORIDE, SODIUM LACTATE AND CALCIUM CHLORIDE: 600; 310; 30; 20 INJECTION, SOLUTION INTRAVENOUS at 07:33

## 2022-08-17 RX ADMIN — EPHEDRINE SULFATE 5 MG: 50 INJECTION INTRAVENOUS at 09:52

## 2022-08-17 RX ADMIN — SODIUM CHLORIDE, POTASSIUM CHLORIDE, SODIUM LACTATE AND CALCIUM CHLORIDE: 600; 310; 30; 20 INJECTION, SOLUTION INTRAVENOUS at 08:27

## 2022-08-17 RX ADMIN — OXYTOCIN 2 MILLI-UNITS/MIN: 10 INJECTION, SOLUTION INTRAMUSCULAR; INTRAVENOUS at 05:25

## 2022-08-17 RX ADMIN — ACETAMINOPHEN 1000 MG: 500 TABLET ORAL at 19:47

## 2022-08-17 RX ADMIN — ONDANSETRON 4 MG: 2 INJECTION INTRAMUSCULAR; INTRAVENOUS at 15:23

## 2022-08-17 ASSESSMENT — PATIENT HEALTH QUESTIONNAIRE - PHQ9
2. FEELING DOWN, DEPRESSED, IRRITABLE, OR HOPELESS: NOT AT ALL
1. LITTLE INTEREST OR PLEASURE IN DOING THINGS: NOT AT ALL
SUM OF ALL RESPONSES TO PHQ9 QUESTIONS 1 AND 2: 0

## 2022-08-17 ASSESSMENT — FIBROSIS 4 INDEX: FIB4 SCORE: 0.45

## 2022-08-17 ASSESSMENT — PAIN DESCRIPTION - PAIN TYPE
TYPE: ACUTE PAIN

## 2022-08-17 ASSESSMENT — PAIN SCALES - GENERAL: PAIN_LEVEL: 0

## 2022-08-17 ASSESSMENT — LIFESTYLE VARIABLES: EVER_SMOKED: NEVER

## 2022-08-17 NOTE — PROGRESS NOTES
0700- Report received from JIMY Valdovinos. POC discussed. Pt stable at this time.    0830- Pt requested epidural, fluid bolus started and Dr. Dias notified. Consents signed.    0853- Test dose given. Epidural in place. Pt tolerated well. Pt reports legs feeling numb.     0939- Dr. Dias updated on pt blood pressure and feelings of nausea/lightheadedness. Pt given 5 mg ephedrine per dr. Dias orders. Rate of epidural reduced to 8 ml/hr. 500 mL bolus given per dr. Dias orders.    0941- Pt reports feeling numb in the chest and difficulty breathing. Dr. Dias notified. Ropivocaine tunred off per dr. Dias. Continuous oxygen monitoring, O2 sat WNL.     0945- Dr. Dias at bedside. Dermatone test performed, level at T4.     0952- Charge notified and second dose of ephedrine given. Pt on left side. RN continuously at bedside.     1012- Pt reports a decrease in nausea and that she has a clear head. Pt states it feels easier to breath now. MAP WNL. Baby heart rate reassuring. Dr. Dias updated on patient, no further orders at this time.     1043- Dr. Dias updated on patient status, gave okay to restart epidural at 8 whenever patient is ready to restart.    1124- Dr. Pena at bedside, SVE per flowsheet. AROM, scant, clear fluid with blood tinge.      1230- Ropivacaine restarted. Pt states she only feels numb in her legs and is starting to feel the contractions.    1432- Dr. Pena at bedside. SVE per charted in flowsheet. IUPC inserted. Pt tolerated well. Pt in throne.     1445- Pt reports that she cannot feel anything and that she is numb in chest again. Dermatone test done per flowsheet. Dr. Dias updated and gave orders to turn epidural down to 6 ml/hr.     1539- Dr. Pena at bedside. Pt complete/100/0. Pt educated and prepared for pushing.     1555- Pt pushing started.     1659- Dr. Dias at bedside. Gave okay to restart epidural at 1 cc/hr when pt starts to regain feeling.    1745- Pt  reports feeling increasing pain. Epidural restarted at 1 cc/hr, per Dr. Dias's orders.     1808- Delivery of viable baby boy at 1803. APGARS 2/8. Pitocin started. Epidural turned off. Cord gases collected. Larissa care provided. Fundal rubs initiated.     1900- Report given to JIMY Valdovinos. POC discussed. Pt stable at this time.

## 2022-08-17 NOTE — CARE PLAN
Problem: Knowledge Deficit - L&D  Goal: Patient and family/caregivers will demonstrate understanding of plan of care, disease process/condition, diagnostic tests and medications  Outcome: Progressing     Problem: Risk for Excess Fluid Volume  Goal: Patient will demonstrate pulse, blood pressure and neurologic signs within expected ranges and without any respiratory complications  Outcome: Progressing     Problem: Psychosocial - L&D  Goal: Patient's level of anxiety will decrease  Outcome: Progressing  Goal: Patient will be able to discuss coping skills during hospitalization  Outcome: Progressing  Goal: Patient's ability to re-evaluate and adapt role responsibilities will improve  Outcome: Progressing  Goal: Spiritual and cultural needs incorporated into hospitalization  Outcome: Progressing     Problem: Risk for Venous Thromboembolism (VTE)  Goal: VTE prevention measures will be implemented and patient will remain free from VTE  Outcome: Progressing     Problem: Risk for Infection and Impaired Wound Healing  Goal: Patient will remain free from infection  Outcome: Progressing  Goal: Patient's wound/surgical incision will decrease in size and heals properly  Outcome: Progressing     Problem: Risk for Fluid Imbalance  Goal: Patient's fluid volume balance will be maintained or improve  Outcome: Progressing     Problem: Risk for Injury  Goal: Patient and fetus will be free of preventable injury/complications  Outcome: Progressing     Problem: Pain  Goal: Patient's pain will be alleviated or reduced to the patient’s comfort goal  Outcome: Progressing     Problem: Discharge Barriers/Planning  Goal: Patient's continuum of care needs are met  Outcome: Progressing   The patient is Stable - Low risk of patient condition declining or worsening    Shift Goals  Clinical Goals: Get adequate contractions  Patient Goals: Healty mom, Healthy baby    Progress made toward(s) clinical / shift goals:  Progressing

## 2022-08-17 NOTE — PROGRESS NOTES
Labor Progress Note    Jarret Vargas   39w5d      Subjective:  Uterine contractions:yes  Pain: No    Objective:   Vitals:    08/17/22 1030 08/17/22 1035 08/17/22 1040 08/17/22 1045   BP: (!) 95/54 (!) 92/57 (!) 88/54 (!) 89/53   Pulse: 80 76 78 76   Temp:       TempSrc:       SpO2: 97% 92% 94% 95%   Weight:       Height:         Fetal heart variability: moderate  Fetal Heart Rate decelerations: none  Fetal Heart Rate accelerations: yes  Baseline FHR: 120 per minute  Uterine contractions: regular, every 3 minutes  Cervical: 50% ;  Dilatation .4 ; Station negative2    Membranes ruptured: .yes and bloody    Meds:   Epidural : .yes  Pitocin: .yes    Labs:  Recent Results (from the past 24 hour(s))   CBC WITH DIFFERENTIAL    Collection Time: 08/17/22  4:30 AM   Result Value Ref Range    WBC 8.6 4.8 - 10.8 K/uL    RBC 3.88 (L) 4.20 - 5.40 M/uL    Hemoglobin 11.7 (L) 12.0 - 16.0 g/dL    Hematocrit 34.3 (L) 37.0 - 47.0 %    MCV 88.4 81.4 - 97.8 fL    MCH 30.2 27.0 - 33.0 pg    MCHC 34.1 33.6 - 35.0 g/dL    RDW 44.7 35.9 - 50.0 fL    Platelet Count 166 164 - 446 K/uL    MPV 10.5 9.0 - 12.9 fL    Neutrophils-Polys 52.80 44.00 - 72.00 %    Lymphocytes 40.80 22.00 - 41.00 %    Monocytes 4.60 0.00 - 13.40 %    Eosinophils 0.90 0.00 - 6.90 %    Basophils 0.00 0.00 - 1.80 %    Nucleated RBC 0.00 /100 WBC    Neutrophils (Absolute) 4.54 2.00 - 7.15 K/uL    Lymphs (Absolute) 3.51 1.00 - 4.80 K/uL    Monos (Absolute) 0.40 0.00 - 0.85 K/uL    Eos (Absolute) 0.08 0.00 - 0.51 K/uL    Baso (Absolute) 0.00 0.00 - 0.12 K/uL    NRBC (Absolute) 0.00 K/uL   HOLD BLOOD BANK SPECIMEN (NOT TESTED)    Collection Time: 08/17/22  4:30 AM   Result Value Ref Range    Holding Tube - Bb DONE    PLATELET ESTIMATE    Collection Time: 08/17/22  4:30 AM   Result Value Ref Range    Plt Estimation Normal    MORPHOLOGY    Collection Time: 08/17/22  4:30 AM   Result Value Ref Range    RBC Morphology Present     Smudge Cells Few    PERIPHERAL SMEAR REVIEW     Collection Time: 08/17/22  4:30 AM   Result Value Ref Range    Peripheral Smear Review see below    DIFFERENTIAL MANUAL    Collection Time: 08/17/22  4:30 AM   Result Value Ref Range    Metamyelocytes 0.90 %    Manual Diff Status PERFORMED        Assessment:   39w5d  Labor State: Early latent labor.      Grecia Pena M.D.

## 2022-08-17 NOTE — ANESTHESIA PREPROCEDURE EVALUATION
Date: 08/17/22  Procedure: Labor Epidural         Relevant Problems   No relevant active problems       Physical Exam    Airway   Mallampati: II  TM distance: >3 FB  Neck ROM: full       Cardiovascular - normal exam  Rhythm: regular  Rate: normal  (-) murmur     Dental - normal exam           Pulmonary - normal exam  Breath sounds clear to auscultation     Abdominal     Comments: Gravid   Neurological - normal exam                 Anesthesia Plan    ASA 2       Plan - epidural   Neuraxial block will be labor analgesia                  Pertinent diagnostic labs and testing reviewed    Informed Consent:    Anesthetic plan and risks discussed with patient.

## 2022-08-17 NOTE — ANESTHESIA PROCEDURE NOTES
Epidural Block    Date/Time: 8/17/2022 8:42 AM  Performed by: Kentrell Dias M.D.  Authorized by: Kentrell Dias M.D.     Patient Location:  OB  Start Time:  8/17/2022 8:42 AM  End Time:  8/17/2022 8:53 AM  Reason for Block: labor analgesia    patient identified, IV checked, site marked, risks and benefits discussed, surgical consent, monitors and equipment checked, pre-op evaluation and timeout performed    Patient Position:  Sitting  Prep: ChloraPrep, patient draped and sterile technique    Monitoring:  Blood pressure, continuous pulse oximetry and heart rate  Approach:  Midline  Location:  L3-L4  Injection Technique:  YANA saline  Skin infiltration:  Lidocaine  Strength:  1%  Dose:  3ml  Needle Type:  Tuohy  Needle Gauge:  17 G  Needle Length:  3.5 in  Loss of resistance::  6  Catheter Size:  19 G  Catheter at Skin Depth:  12  Test Dose Result:  Negative

## 2022-08-17 NOTE — PROGRESS NOTES
Called to patient bedside given continued elevated dermatomes/sensory levels of analgesia after rate of epidural was decreased. After drawing back with 3mL syringe on epidural catheter, free fluid return was noted and it appears that catheter has migrated intrathecal. Epidural gtt discontinued and will resume at 1cc/hr as needed. Pt remains hemodynamically stable. Informed of possible increased risk of PDPH.

## 2022-08-17 NOTE — PROGRESS NOTES
0400: pt  here at 39w5d for elective IOL. She reports +FM and denies LOF, VB or regular ctx. SVE 3/60/1. Admission orders initiated  0700: report given to JIMY Cavanaugh

## 2022-08-17 NOTE — H&P
"OB history and physical    Chief complaint: Induction of labor    HPI:  32-year-old -0-0-1 at 39 weeks 5 days by LMP consistent with 9-week ultrasound here for desired term induction of labor.  Patient has been followed by Dr. Pena denies complications with this pregnancy.  On review of records she was noted to have a low-lying placenta which resolved by 28 weeks.  Mild bilateral renal pelvic dilation was noted on 20-week ultrasound, left measuring 8.1 mm and right measuring 7.9 mm, this appeared to have resolved on follow-up ultrasound at 36 weeks.  36-week growth ultrasound performed  showed an AGA fetus with baby measuring 38%, 2059 g.    Prenatal labs:  Rh+, antibody screen negative, HIV/HBsAg/HCV/RPR negative  GC/CT negative  1 hour 97  GBS negative    ROS:  Const: denies fevers, general concerns  CV/resp: reports no concerns  GI: denies abd pain, GI concerns  : see HPI  Neuro: Reports no HA/vision changes      OB history: -0-0-1  2020: Full-term , male, 6.8 pounds    GYN history: Denies history of abnormal Paps and STIs    Past medical history: Postpartum depression, migraine, IBS   past surgical history: Ferguson teeth  Family history: Breast cancer, diabetes, coronary artery disease, hypertension    Social history: Denies alcohol, drugs, tobacco  Allergies: NKDA  Meds: Prenatal vitamin        PE:  Vitals:    22 0400 22 0426 22 0427   BP:  118/67    Pulse:  (!) 106 (!) 110   Temp:  36.6 °C (97.8 °F)    TempSrc:  Temporal    SpO2:   89%   Weight: 72.6 kg (160 lb)     Height: 1.499 m (4' 11\")       gen: AAO, NAD      SVE: 3/60/-1 per RN @ 0400  FHT: 130/mod variability/+ accels/ no decels  aleksandr:few ctx    A/P: 32 y.o.  @ 39w5d by lmp c/w 9wk US here for desired term IOL  Induction of labor: We will start with Pitocin  FHTs: Reactive, no decelerations  GBS negative  Mild pyelectasis: Resolved on last ultrasound      Linnea Lua MD  [facility]        "

## 2022-08-18 ENCOUNTER — PHARMACY VISIT (OUTPATIENT)
Dept: PHARMACY | Facility: MEDICAL CENTER | Age: 32
End: 2022-08-18
Payer: COMMERCIAL

## 2022-08-18 LAB
ERYTHROCYTE [DISTWIDTH] IN BLOOD BY AUTOMATED COUNT: 46.2 FL (ref 35.9–50)
HCT VFR BLD AUTO: 28.1 % (ref 37–47)
HGB BLD-MCNC: 9.4 G/DL (ref 12–16)
MCH RBC QN AUTO: 30.3 PG (ref 27–33)
MCHC RBC AUTO-ENTMCNC: 33.5 G/DL (ref 33.6–35)
MCV RBC AUTO: 90.6 FL (ref 81.4–97.8)
PLATELET # BLD AUTO: 134 K/UL (ref 164–446)
PMV BLD AUTO: 10.4 FL (ref 9–12.9)
RBC # BLD AUTO: 3.1 M/UL (ref 4.2–5.4)
WBC # BLD AUTO: 9.8 K/UL (ref 4.8–10.8)

## 2022-08-18 PROCEDURE — 36415 COLL VENOUS BLD VENIPUNCTURE: CPT

## 2022-08-18 PROCEDURE — RXMED WILLOW AMBULATORY MEDICATION CHARGE: Performed by: OBSTETRICS & GYNECOLOGY

## 2022-08-18 PROCEDURE — 85027 COMPLETE CBC AUTOMATED: CPT

## 2022-08-18 PROCEDURE — 700102 HCHG RX REV CODE 250 W/ 637 OVERRIDE(OP): Performed by: OBSTETRICS & GYNECOLOGY

## 2022-08-18 PROCEDURE — 770002 HCHG ROOM/CARE - OB PRIVATE (112)

## 2022-08-18 PROCEDURE — A9270 NON-COVERED ITEM OR SERVICE: HCPCS | Performed by: OBSTETRICS & GYNECOLOGY

## 2022-08-18 RX ORDER — OXYCODONE HYDROCHLORIDE 5 MG/1
5 TABLET ORAL EVERY 4 HOURS PRN
Qty: 30 TABLET | Refills: 0 | Status: SHIPPED | OUTPATIENT
Start: 2022-08-18 | End: 2022-08-25

## 2022-08-18 RX ORDER — IBUPROFEN 800 MG/1
800 TABLET ORAL EVERY 8 HOURS PRN
Qty: 30 TABLET | Refills: 1 | Status: SHIPPED | OUTPATIENT
Start: 2022-08-18 | End: 2023-10-25

## 2022-08-18 RX ADMIN — ACETAMINOPHEN 1000 MG: 500 TABLET ORAL at 22:01

## 2022-08-18 RX ADMIN — DOCUSATE SODIUM 100 MG: 100 CAPSULE, LIQUID FILLED ORAL at 10:41

## 2022-08-18 RX ADMIN — IBUPROFEN 800 MG: 800 TABLET, FILM COATED ORAL at 02:54

## 2022-08-18 RX ADMIN — IBUPROFEN 800 MG: 800 TABLET, FILM COATED ORAL at 10:41

## 2022-08-18 RX ADMIN — ACETAMINOPHEN 1000 MG: 500 TABLET ORAL at 10:32

## 2022-08-18 RX ADMIN — IBUPROFEN 800 MG: 800 TABLET, FILM COATED ORAL at 22:01

## 2022-08-18 RX ADMIN — ACETAMINOPHEN 1000 MG: 500 TABLET ORAL at 02:54

## 2022-08-18 RX ADMIN — DOCUSATE SODIUM 100 MG: 100 CAPSULE, LIQUID FILLED ORAL at 22:01

## 2022-08-18 ASSESSMENT — PAIN DESCRIPTION - PAIN TYPE
TYPE: ACUTE PAIN
TYPE: ACUTE PAIN

## 2022-08-18 NOTE — L&D DELIVERY NOTE
Procedure:     Jarret Vargas is a 32 y.o.  at [id number] is an established patient of .  Patient was admitted for induction of labor due to postdates.  Patient received Pitocin for augmentation of labor process.  She progressed to complete/complete and a +2 station.  I was called to the delivery room, and entered the room as the infant was being delivered.  Patient  pushed with the fetal head delivering over intact Yes,  perineum.  Fetal head delivered in an OA position.  Nuchal cordwas present and reduced.  Anterior and posterior shoulders then delivered without complications with the rest of the body to follow.    The infant male was placed on the maternal abdomen and attended by awaiting nursing staff.  The Apgars were 2 at 1 minute and 8 at 5 minutes.  After approximately 1 to 3 minutes the cord was then clamped x2 and cut.  The placenta delivered spontaneously, Lares ,intact with a three-vessel cord.  There was a 1st degree perineal laceration.  Estimated blood loss 100 cc  Sponge count correct  Mother to recover in labor and delivery, infant to be transferred to well-baby nursery.

## 2022-08-18 NOTE — PROGRESS NOTES
2035: Assumed care from L&D, oriented patient to room, call light, emergency light, TV/Bed remote. Assessment completed, fundus firm, lochia light. . Plan of care reviewed. Patient verbalizes understanding of spinal HA symptoms and will call if these develop. Denies symptoms and other pain at this time, will call if intervention needed.

## 2022-08-18 NOTE — DISCHARGE PLANNING
Meds-to-Beds: Discharge prescription orders listed below delivered to patient's bedside. RN Martha notified. Patient counseled. Co-payment processed by pharmacy.      Current Outpatient Medications   Medication Sig Dispense Refill    ibuprofen (MOTRIN) 800 MG Tab Take 1 Tablet by mouth every 8 hours as needed for Mild Pain. 30 Tablet 1    oxyCODONE immediate-release (ROXICODONE) 5 MG Tab Take 1 Tablet by mouth every four hours as needed for Severe Pain for up to 7 days. 30 Tablet 0      Marissa King, PharmD

## 2022-08-18 NOTE — DISCHARGE SUMMARY
Discharge Summary:      Jarret Vargas    Admit Date:   2022  Discharge Date:  2022     Admitting diagnosis:  Labor and delivery indication for care or intervention [O75.9]  Encounter for induction of labor [Z34.90]  Discharge Diagnosis: Status post vaginal, spontaneous.  Pregnancy Complications: none  Tubal Ligation:  no        History:  History reviewed. No pertinent past medical history.  OB History    Para Term  AB Living   2 2 2 0 0 2   SAB IAB Ectopic Molar Multiple Live Births   0 0 0 0 0 2      # Outcome Date GA Lbr Oz/2nd Weight Sex Delivery Anes PTL Lv   2 Term 22 39w5d / 02:24 2.9 kg (6 lb 6.3 oz) M Vag-Spont EPI N GREY   1 Term 20 39w5d   M Vag-Spont  N GREY        Other misc  Patient Active Problem List    Diagnosis Date Noted    Labor and delivery indication for care or intervention 2022    Encounter for induction of labor 2022    Vitamin D deficiency 2021        Hospital Course:   32 y.o. , now para 2, was admitted with the above mentioned diagnosis, underwent Induction of Labor, vaginal, spontaneous. Patient postpartum course was unremarkable, with progressive advancement in diet , ambulation and toleration of oral analgesia. Patient without complaints today and desires discharge.      Vitals:    22 1925 22 2000 22 2200 22 2235   BP: 105/53 117/60 (!) 90/50 104/56   Pulse:  94 83 84   Resp:  18 18 18   Temp:  36.7 °C (98.1 °F) 36.9 °C (98.4 °F) 36 °C (96.8 °F)   TempSrc:  Temporal Temporal Temporal   SpO2:  94% 97% 97%   Weight:       Height:           Current Facility-Administered Medications   Medication Dose    LR infusion      oxytocin (PITOCIN) infusion (for post delivery)  125 mL/hr    oxytocin (PITOCIN) infusion (for induction)  0.5-20 shonna-units/min    ropivacaine 0.2 % (NAROPIN) injection      LIDOCAINE HCL (PF) 1 % INJ SOLN      lactated ringers infusion      docusate sodium (COLACE) capsule 100 mg  100  mg    ibuprofen (MOTRIN) tablet 800 mg  800 mg    acetaminophen (TYLENOL) tablet 1,000 mg  1,000 mg    tetanus-dipth-acell pertussis (Tdap) inj 0.5 mL  0.5 mL    measles, mumps and rubella vaccine (MMR) injection 0.5 mL  0.5 mL    PRN oxytocin (PITOCIN) (20 Units/1000 mL) PRN for excessive uterine bleeding - See Admin Instr  125-999 mL/hr    miSOPROStol (CYTOTEC) tablet 600 mcg  600 mcg    methylergonovine (METHERGINE) injection 0.2 mg  0.2 mg    carboPROST (HEMABATE) injection 250 mcg  250 mcg    oxyCODONE immediate-release (ROXICODONE) tablet 5 mg  5 mg       Exam:  Breast Exam: negative  Abdomen: Abdomen soft, non-tender. BS normal. No masses,  No organomegaly  Fundus Non Tender: yes  Incision: none  Perineum: perineum intact  Extremity: extremities, peripheral pulses and reflexes normal     Labs:  Recent Labs     08/17/22  0430 08/18/22  0430   WBC 8.6 9.8   RBC 3.88* 3.10*   HEMOGLOBIN 11.7* 9.4*   HEMATOCRIT 34.3* 28.1*   MCV 88.4 90.6   MCH 30.2 30.3   MCHC 34.1 33.5*   RDW 44.7 46.2   PLATELETCT 166 134*   MPV 10.5 10.4        Activity:   Discharge to home  Pelvic Rest x 6 weeks    Assessment:  normal postpartum course, possible intrathecal epidural  Discharge Assessment: No areas of skin breakdown/redness; surgical incision intact/healing     Follow up: [facility] 6 weeks.      Discharge Meds:   Current Outpatient Medications   Medication Sig Dispense Refill    ibuprofen (MOTRIN) 800 MG Tab Take 1 Tablet by mouth every 8 hours as needed for Mild Pain. 30 Tablet 1    oxyCODONE immediate-release (ROXICODONE) 5 MG Tab Take 1 Tablet by mouth every four hours as needed for Severe Pain for up to 7 days. 30 Tablet 0       Grecia Pena M.D.

## 2022-08-18 NOTE — ANESTHESIA TIME REPORT
Anesthesia Start and Stop Event Times     Date Time Event    8/17/2022 0840 Ready for Procedure     0842 Anesthesia Start     1803 Anesthesia Stop        Responsible Staff  08/17/22    Name Role Begin End    Kentrell Dias M.D. Anesth 0842 1803        Overtime Reason:  no overtime (within assigned shift)    Comments:

## 2022-08-18 NOTE — DISCHARGE PLANNING
Discharge Planning Assessment Post Partum    Reason for Referral: History of post partum depression  Address: [address] [address]  Phone: [phone]  Type of Living Situation: Pregnancy  Mom Diagnosis: Pregnancy  Baby Diagnosis: [place]-39.5 weeks  Primary Language: English    Father of the Baby: Kenroy Zavaleta   Involved in baby’s care? Yes  Contact Information: [phone]    Prenatal Care: Yes-Dr. Pena  Mom's PCP: DEBI Potts  PCP for new baby: Dr. Hancock    Support System: FOB  Coping/Bonding between mother & baby: Yes  Source of Feeding: breast feeding  Supplies for Infant: prepared for infant; denies any needs    Mom's Insurance: [place]  Baby Covered on Insurance: Yes  Mother Employed/School: [employer]  Other children in the home/names & ages: son-age 2 years    Financial Hardship/Income: No   Mom's Mental status: alert and oriented  Services used prior to admit: None    CPS History: No  Psychiatric History: history of post partum depression.  Discussed with mother and provided a list of counseling and support group resources specializing in post partum depression  Domestic Violence History: No  Drug/ETOH History: No    Resources Provided: post partum support and counseling resources specializing in maternal mental health and post partum depression  Referrals Made: None     Clearance for Discharge: Infant is cleared to discharge home with parents once medically cleared

## 2022-08-18 NOTE — CARE PLAN
The patient is Stable - Low risk of patient condition declining or worsening    Shift Goals  Clinical Goals: Maintain stable vitals  Patient Goals: Healty mom, Healthy baby    Progress made toward(s) clinical / shift goals:    Problem: Psychosocial - Postpartum  Goal: Patient will verbalize and demonstrate effective bonding and parenting behavior  Note: Patient demonstrates effective understanding of self and infant care.      Problem: Altered Physiologic Condition  Goal: Patient physiologically stable as evidenced by normal lochia, palpable uterine involution and vitals within normal limits  Note: Fundus firm, lochia light

## 2022-08-18 NOTE — PROGRESS NOTES
Pt assessed, fundus firm, lochia light. No s/s of distress. Bonding well w/ infant. Pt up and voiding w/o difficulty. Denies pain at this time, removed iv to shower.

## 2022-08-18 NOTE — ANESTHESIA POSTPROCEDURE EVALUATION
Patient: Jarret Vargas    Procedure Summary     Date: 08/17/22 Room / Location:     Anesthesia Start: 0842 Anesthesia Stop: 1803    Procedure: Labor Epidural Diagnosis:     Scheduled Providers:  Responsible Provider: Kentrell Dias M.D.    Anesthesia Type: epidural ASA Status: 2          Final Anesthesia Type: epidural  Last vitals  BP   Blood Pressure: (!) 89/54    Temp   37.2 °C (99 °F)    Pulse   77   Resp        SpO2   98 %      Anesthesia Post Evaluation    Patient location during evaluation: floor  Patient participation: complete - patient participated  Level of consciousness: awake and alert  Pain score: 0    Airway patency: patent  Anesthetic complications: no  Cardiovascular status: hemodynamically stable  Respiratory status: acceptable  Hydration status: euvolemic    PONV: none          No notable events documented.

## 2022-08-18 NOTE — PROGRESS NOTES
1900: report received from JIMY Cavanaugh. Fundus firm above and to the R of umbilicus. She is able to move all extremities. Bleeding light. Pt denies concerns or needs. Education provided on epidural catheter remaining in place due to placement  1945: pt requesting pain intervention, Tylenol given  2000: Dr. Dias at bedside to discuss epidural catheter plan. Signs and symptoms of spinal HA reviewed and written down for pt. All questions answered  2035: pt ambulated and voided and passed BM without difficulty. She was transferred to  in stable condition. Report given to JIMY Alexandra

## 2022-08-18 NOTE — LACTATION NOTE
This note was copied from a baby's chart.    Met with mother for an initial LC visit. MOB reports that feedings are going well, infant has been able to latch on to the breast a couple of times, and has also been supplemented with formula. MOB used a nipple shield with her previous child for 6 months of breastfeeding, usually supplementing one feeding with formula per 24hrs. MOB reports that her milk supply with her previous child was inadequate to continue to breastfeed beyond 6 months.   Infant was sleeping in the bassinet initially, and began to wake, displaying hunger cues. Hunger cues reviewed with MOB. LC placed infant skin to skin with MOB and assisted with latch to the left breast. Infant able to latch without difficulty or discomfort for MOB. Assisted with positioning, and MOB/infant able to maintain latch for two minutes before infant fell asleep. MOB encouraged to hand express both breasts and feed back EBM to infant if infant remains sleepy at this time. Able to easily hand express drops of colostrum from both breasts. Education provided regarding the importance of feeding infant on demand at least 8-12 times every 24hrs or more frequently per infant hunger cues. Educated regarding the importance of frequent stimulation of breasts to help to bring in milk supply. Education provided regarding nipple shield, at this time, infant able to latch on to the breasts without the shield, so MOB was encouraged to continue to latch infant on to the breasts without the nipple shield. Anticipatory guidance provided regarding cluster feeding, and to allow infant to be at the breasts as long as it is safe and comfortable to do so. MOB does have an electric and haaka breast pump at home. Plan is to continue to place infant skin to skin to latch infant to breasts at least 8-12 times every 24hrs or more frequently per infant hunger cues.

## 2022-08-19 VITALS
BODY MASS INDEX: 32.25 KG/M2 | SYSTOLIC BLOOD PRESSURE: 114 MMHG | DIASTOLIC BLOOD PRESSURE: 71 MMHG | WEIGHT: 160 LBS | TEMPERATURE: 97.3 F | OXYGEN SATURATION: 97 % | RESPIRATION RATE: 16 BRPM | HEIGHT: 59 IN | HEART RATE: 82 BPM

## 2022-08-19 PROCEDURE — A9270 NON-COVERED ITEM OR SERVICE: HCPCS | Performed by: OBSTETRICS & GYNECOLOGY

## 2022-08-19 PROCEDURE — 700102 HCHG RX REV CODE 250 W/ 637 OVERRIDE(OP): Performed by: OBSTETRICS & GYNECOLOGY

## 2022-08-19 RX ADMIN — IBUPROFEN 800 MG: 800 TABLET, FILM COATED ORAL at 06:38

## 2022-08-19 RX ADMIN — DOCUSATE SODIUM 100 MG: 100 CAPSULE, LIQUID FILLED ORAL at 06:38

## 2022-08-19 RX ADMIN — ACETAMINOPHEN 1000 MG: 500 TABLET ORAL at 06:38

## 2022-08-19 ASSESSMENT — PAIN DESCRIPTION - PAIN TYPE
TYPE: ACUTE PAIN

## 2022-08-19 ASSESSMENT — EDINBURGH POSTNATAL DEPRESSION SCALE (EPDS)
I HAVE FELT SCARED OR PANICKY FOR NO GOOD REASON: NO, NOT AT ALL
I HAVE BEEN ABLE TO LAUGH AND SEE THE FUNNY SIDE OF THINGS: AS MUCH AS I ALWAYS COULD
I HAVE BLAMED MYSELF UNNECESSARILY WHEN THINGS WENT WRONG: NO, NEVER
I HAVE BEEN SO UNHAPPY THAT I HAVE HAD DIFFICULTY SLEEPING: NOT AT ALL
THINGS HAVE BEEN GETTING ON TOP OF ME: NO, I HAVE BEEN COPING AS WELL AS EVER
I HAVE BEEN SO UNHAPPY THAT I HAVE BEEN CRYING: NO, NEVER
THE THOUGHT OF HARMING MYSELF HAS OCCURRED TO ME: NEVER
I HAVE BEEN ANXIOUS OR WORRIED FOR NO GOOD REASON: NO, NOT AT ALL
I HAVE LOOKED FORWARD WITH ENJOYMENT TO THINGS: AS MUCH AS I EVER DID
I HAVE FELT SAD OR MISERABLE: NO, NOT AT ALL

## 2022-08-19 NOTE — DISCHARGE INSTRUCTIONS
PATIENT DISCHARGE EDUCATION INSTRUCTION SHEET    REASONS TO CALL YOUR OBSTETRICIAN  Persistent fever, shaking, chills (Temperature higher than 100.4) may indicate you have an infection  Heavy bleeding: soaking more than 1 pad per hour; Passing clots an egg-sized clot or bigger may mean you have an postpartum hemorrhage  Foul odor from vagina or bad smelling or discolored discharge or blood  Breast infection (Mastitis symptoms); breast pain, chills, fever, redness or red streaks, may feel flu like symptoms  Urinary pain, burning or frequency  Incision that is not healing, increased redness, swelling, tenderness or pain, or any pus from episiotomy may mean you have an infection  Redness, swelling, warmth, or painful to touch in the calf area of your leg may mean you have a blood clot  Severe or intensified depression, thoughts or feelings of wanting to hurt yourself or someone else   Pain in chest, obstructed breathing or shortness of breath (trouble catching your breath) may mean you are having a postpartum complication. Call your provider immediately   Headache that does not get better, even after taking medicine, a bad headache with vision changes or pain in the upper right area of your belly may mean you have high blood pressure or post birth preeclampsia. Call your provider immediately    HAND WASHING  All family and friends should wash their hands:  Before and after holding the baby  Before feeding the baby  After using the restroom or changing the baby's diaper    WOUND CARE  Ask your physician for additional care instructions. In general:  Episiotomy/Laceration  May use livia-spray bottle, witch hazel pads and dermaplast spray for comfort  Use livia-spray bottle after urinating to cleanse perineal area  To prevent burning during urination spray livia-water bottle on labial area   Pat perineal area dry until episiotomy/laceration is healed  Continue to use livia-bottle until bleeding stops as needed  If have a 2nd  degree laceration or greater, a Sitz bath can offer relief from soreness, burning, and inflammation   Sitz Bath   Sit in 6 inches of warm water and soak laceration as needed until the laceration heals    VAGINAL CARE AND BLEEDING  Nothing inside vagina for 6 weeks:   No sexual intercourse, tampons or douching  Bleeding may continue for 2-4 weeks. Amount and color may vary  Soaking 1 pad or more in an hour for several hours is considered heavy bleeding  Passing large egg sized blood clots can be concerning  If you feel like you have heavy bleeding or are having increasing amount of blood clots call your Obstetrician immediately  If you begin feeling faint upon standing, feeling sick to your stomach, have clammy skin, a really fast heartbeat, have chills, start feeling confused, dizzy, sleepy or weak, or feeling like you're going to faint call your Obstetrician immediately    HYPERTENSION   Preeclampsia or gestational hypertension are types of high blood pressure that only pregnant women can get. It is important for you to be aware of symptoms to seek early intervention and treatment. If you have any of these symptoms immediately call your Obstetrician    Vision changes or blurred vision   Severe headache or pain that is unrelieved with medication and will not go away  Persistent pain in upper abdomen or shoulder   Increased swelling of face, feet, or hands  Difficulty breathing or shortness of breath at rest  Urinating less than usual    URINATION AND BOWEL MOVEMENTS  Eating more fiber (bran cereal, fruits, and vegetables) and drinking plenty of fluids will help to avoid constipation  Urinary frequency and urgency after childbirth is normal  If you experience any urinary pain, burning or frequency call your provider    BIRTH CONTROL  It is possible to become pregnant at any time after delivery and while breastfeeding  Plan to discuss a method of birth control with your physician at your post delivery follow up  "visit    POSTPARTUM BLUES  During the first few days after birth, you may experience a sense of the \"blues\" which may include impatience, irritability or even crying. These feelings come and go quickly. However, as many as 1 in 10 women experience emotional symptoms known as postpartum depression.     POSTPARTUM DEPRESSION    May start as early as the second or third day after delivery or take several weeks or months to develop. Symptoms of \"blues\" are present, but are more intense: Crying spells; loss of appetite; feelings of hopelessness or loss of control; fear of touching the baby; over concern or no concern at all about the baby; little or no concern about your own appearance/caring for yourself; and/or inability to sleep or excessive sleeping. Contact your Obstetrician if you are experiencing any of these symptoms     PREVENTING SHAKEN BABY  If you are angry or stressed, PUT THE BABY IN THE CRIB, step away, take some deep breaths, and wait until you are calm to care for the baby. DO NOT SHAKE THE BABY. You are not alone, call a supporter for help.  [facility] 24/7 crisis call line (060-582-3461) or ([phone])  You can also text them, text \"ANSWER\" (401340)  "

## 2022-08-19 NOTE — DISCHARGE SUMMARY
DATE OF ADMISSION:  08/17/2022   DATE OF DISCHARGE:  08/19/2022     ADMITTING DIAGNOSES:  1.  Pregnancy at 39 and 5/7th weeks for induction of labor.  2.  Beta strep negative.     DISCHARGE DIAGNOSES:    1.  Pregnancy at 39 and 5/7th weeks for induction of labor.  2.  Beta strep negative.  3.  Status post normal spontaneous vaginal delivery.     HOSPITAL COURSE IN DETAIL:  This patient was admitted on the aforementioned   date for induction of labor.  She received Pitocin augmentation and progressed   over normal labor curve and eventually delivered a male infant with Apgars of   2 and 8, the patient and baby in recovery room in stable condition.  On   postpartum day #1, she is doing well without complaint, tolerating regular   diet with minimal lochia.  Today, postpartum day #2, she desires discharge   home.  She is afebrile.  Her vital signs are within normal limits.  Her   abdomen is soft with a full fundus below the umbilicus.     ASSESSMENT:  At this time is postpartum day 2, status post normal spontaneous   vaginal delivery, doing well, desires discharge home.     PLAN:  At this time:  1.  Discharge home.  2.  Follow up in 6 weeks.  3.  Pelvic rest.  4.  Lift precautions.  5.  Scripts for Motrin and ____ consented and written.        ______________________________  MD NIVIA Golden/SUB/JAN    DD:  08/19/2022 06:24  DT:  08/19/2022 07:04    Job#:  [id number]

## 2022-08-19 NOTE — PROGRESS NOTES
0830 Assessment completed. Plan of care reviewed with patient. Patient will call for pain medication intervention if needed.      1758 Patient left facility escorted by staff.

## 2022-08-19 NOTE — LACTATION NOTE
"Jarret reports that baby has been breastfeeding well and she occasionally provides expressed milk from a spoon or a couple of ml of formula if he seems dissatisfied after a breast feed. I encouraged her to consider allowing hi to go \"back\" to the breast first, to help drive production. She was provided lists of discharge resources which we discussed. She is not experiencing any discomfort or pain with nipples.  "

## 2022-08-19 NOTE — CARE PLAN
The patient is Stable - Low risk of patient condition declining or worsening    Shift Goals  Clinical Goals: maintain stable vs, pain management  Patient Goals: healthy mom, healthy baby  Family Goals: support    Progress made toward(s) clinical / shift goals: update pt on POC, 24 hour screen for baby is done, prn pain medications provided for pain, pain is in tolerable limits, and education continued. Pt demonstration appropriate bonding with infant. MOB and baby assessed. No concerns. All questions answered.      Problem: Pain  Goal: Patient's pain will be alleviated or reduced to the patient’s comfort goal  Outcome: Progressing     Problem: Discharge Barriers/Planning  Goal: Patient's continuum of care needs are met  Outcome: Progressing     Problem: Knowledge Deficit - Standard  Goal: Patient and family/care givers will demonstrate understanding of plan of care, disease process/condition, diagnostic tests and medications  Outcome: Progressing     Problem: Knowledge Deficit - Postpartum  Goal: Patient will verbalize and demonstrate understanding of self and infant care  Outcome: Progressing     Problem: Psychosocial - Postpartum  Goal: Patient will verbalize and demonstrate effective bonding and parenting behavior  Outcome: Progressing     Problem: Altered Physiologic Condition  Goal: Patient physiologically stable as evidenced by normal lochia, palpable uterine involution and vitals within normal limits  Outcome: Progressing

## 2022-08-19 NOTE — PROGRESS NOTES
Hospital Day : 2    S: doing well; teresa diet; min bleed    O:  Vitals:    22 2235 22 0600 22 1800 22 0600   BP: 104/56 100/59 101/68 114/71   Pulse: 84 80 86 82   Resp:    Temp: 36 °C (96.8 °F) 35.8 °C (96.5 °F) 36.1 °C (97 °F) 36.3 °C (97.3 °F)   TempSrc: Temporal Temporal Temporal Temporal   SpO2: 97% 98% 98% 97%   Weight:       Height:           Recent Labs     22  0430 22  0430   WBC 8.6 9.8   RBC 3.88* 3.10*   HEMOGLOBIN 11.7* 9.4*   HEMATOCRIT 34.3* 28.1*   MCV 88.4 90.6   MCH 30.2 30.3   MCHC 34.1 33.5*   RDW 44.7 46.2   PLATELETCT 166 134*   MPV 10.5 10.4             Abd soft ff    A: pp2 sp  doing well    P: dc

## 2022-08-23 ENCOUNTER — HOSPITAL ENCOUNTER (EMERGENCY)
Facility: MEDICAL CENTER | Age: 32
End: 2022-08-23
Attending: OBSTETRICS & GYNECOLOGY | Admitting: OBSTETRICS & GYNECOLOGY
Payer: COMMERCIAL

## 2022-08-23 VITALS
TEMPERATURE: 101.4 F | DIASTOLIC BLOOD PRESSURE: 73 MMHG | HEART RATE: 114 BPM | BODY MASS INDEX: 28.35 KG/M2 | WEIGHT: 160 LBS | HEIGHT: 63 IN | SYSTOLIC BLOOD PRESSURE: 125 MMHG

## 2022-08-23 DIAGNOSIS — R50.9 FEVER, UNSPECIFIED FEVER CAUSE: ICD-10-CM

## 2022-08-23 LAB
ALBUMIN SERPL BCP-MCNC: 3.7 G/DL (ref 3.2–4.9)
ALBUMIN/GLOB SERPL: 1.4 G/DL
ALP SERPL-CCNC: 85 U/L (ref 30–99)
ALT SERPL-CCNC: 18 U/L (ref 2–50)
ANION GAP SERPL CALC-SCNC: 12 MMOL/L (ref 7–16)
ANISOCYTOSIS BLD QL SMEAR: ABNORMAL
APPEARANCE UR: CLEAR
AST SERPL-CCNC: 18 U/L (ref 12–45)
BACTERIA #/AREA URNS HPF: ABNORMAL /HPF
BASOPHILS # BLD AUTO: 0 % (ref 0–1.8)
BASOPHILS # BLD: 0 K/UL (ref 0–0.12)
BILIRUB SERPL-MCNC: 0.2 MG/DL (ref 0.1–1.5)
BILIRUB UR QL STRIP.AUTO: NEGATIVE
BUN SERPL-MCNC: 8 MG/DL (ref 8–22)
CALCIUM SERPL-MCNC: 8.5 MG/DL (ref 8.5–10.5)
CHLORIDE SERPL-SCNC: 109 MMOL/L (ref 96–112)
CO2 SERPL-SCNC: 20 MMOL/L (ref 20–33)
COLOR UR: YELLOW
CREAT SERPL-MCNC: 0.79 MG/DL (ref 0.5–1.4)
EOSINOPHIL # BLD AUTO: 0 K/UL (ref 0–0.51)
EOSINOPHIL NFR BLD: 0 % (ref 0–6.9)
EPI CELLS #/AREA URNS HPF: ABNORMAL /HPF
ERYTHROCYTE [DISTWIDTH] IN BLOOD BY AUTOMATED COUNT: 47.8 FL (ref 35.9–50)
FLUAV RNA SPEC QL NAA+PROBE: NEGATIVE
FLUBV RNA SPEC QL NAA+PROBE: NEGATIVE
GFR SERPLBLD CREATININE-BSD FMLA CKD-EPI: 100 ML/MIN/1.73 M 2
GLOBULIN SER CALC-MCNC: 2.6 G/DL (ref 1.9–3.5)
GLUCOSE SERPL-MCNC: 92 MG/DL (ref 65–99)
GLUCOSE UR STRIP.AUTO-MCNC: NEGATIVE MG/DL
HCT VFR BLD AUTO: 28.8 % (ref 37–47)
HGB BLD-MCNC: 9.5 G/DL (ref 12–16)
KETONES UR STRIP.AUTO-MCNC: NEGATIVE MG/DL
LEUKOCYTE ESTERASE UR QL STRIP.AUTO: ABNORMAL
LYMPHOCYTES # BLD AUTO: 6.76 K/UL (ref 1–4.8)
LYMPHOCYTES NFR BLD: 59.3 % (ref 22–41)
MANUAL DIFF BLD: NORMAL
MCH RBC QN AUTO: 30 PG (ref 27–33)
MCHC RBC AUTO-ENTMCNC: 33 G/DL (ref 33.6–35)
MCV RBC AUTO: 90.9 FL (ref 81.4–97.8)
MICRO URNS: ABNORMAL
MICROCYTES BLD QL SMEAR: ABNORMAL
MONOCYTES # BLD AUTO: 0.1 K/UL (ref 0–0.85)
MONOCYTES NFR BLD AUTO: 0.9 % (ref 0–13.4)
MORPHOLOGY BLD-IMP: NORMAL
NEUTROPHILS # BLD AUTO: 4.54 K/UL (ref 2–7.15)
NEUTROPHILS NFR BLD: 39.8 % (ref 44–72)
NITRITE UR QL STRIP.AUTO: NEGATIVE
NRBC # BLD AUTO: 0.02 K/UL
NRBC BLD-RTO: 0.2 /100 WBC
PH UR STRIP.AUTO: 7.5 [PH] (ref 5–8)
PLATELET # BLD AUTO: 190 K/UL (ref 164–446)
PLATELET BLD QL SMEAR: NORMAL
PMV BLD AUTO: 9.5 FL (ref 9–12.9)
POLYCHROMASIA BLD QL SMEAR: NORMAL
POTASSIUM SERPL-SCNC: 3.7 MMOL/L (ref 3.6–5.5)
PROT SERPL-MCNC: 6.3 G/DL (ref 6–8.2)
PROT UR QL STRIP: NEGATIVE MG/DL
RBC # BLD AUTO: 3.17 M/UL (ref 4.2–5.4)
RBC # URNS HPF: ABNORMAL /HPF
RBC BLD AUTO: PRESENT
RBC UR QL AUTO: ABNORMAL
RSV RNA SPEC QL NAA+PROBE: NEGATIVE
SARS-COV-2 RNA RESP QL NAA+PROBE: NOTDETECTED
SMUDGE CELLS BLD QL SMEAR: NORMAL
SODIUM SERPL-SCNC: 141 MMOL/L (ref 135–145)
SP GR UR STRIP.AUTO: 1.01
SPECIMEN SOURCE: NORMAL
UROBILINOGEN UR STRIP.AUTO-MCNC: 0.2 MG/DL
WBC # BLD AUTO: 11.4 K/UL (ref 4.8–10.8)
WBC #/AREA URNS HPF: ABNORMAL /HPF

## 2022-08-23 PROCEDURE — 80053 COMPREHEN METABOLIC PANEL: CPT

## 2022-08-23 PROCEDURE — 81001 URINALYSIS AUTO W/SCOPE: CPT

## 2022-08-23 PROCEDURE — C9803 HOPD COVID-19 SPEC COLLECT: HCPCS | Performed by: OBSTETRICS & GYNECOLOGY

## 2022-08-23 PROCEDURE — 85025 COMPLETE CBC W/AUTO DIFF WBC: CPT

## 2022-08-23 PROCEDURE — 302449 STATCHG TRIAGE ONLY (STATISTIC)

## 2022-08-23 PROCEDURE — 700102 HCHG RX REV CODE 250 W/ 637 OVERRIDE(OP): Performed by: OBSTETRICS & GYNECOLOGY

## 2022-08-23 PROCEDURE — A9270 NON-COVERED ITEM OR SERVICE: HCPCS | Performed by: OBSTETRICS & GYNECOLOGY

## 2022-08-23 PROCEDURE — 0241U HCHG SARS-COV-2 COVID-19 NFCT DS RESP RNA 4 TRGT MIC: CPT

## 2022-08-23 PROCEDURE — 85007 BL SMEAR W/DIFF WBC COUNT: CPT

## 2022-08-23 PROCEDURE — 36415 COLL VENOUS BLD VENIPUNCTURE: CPT

## 2022-08-23 RX ORDER — ACETAMINOPHEN 500 MG
1000 TABLET ORAL EVERY 4 HOURS PRN
Status: DISCONTINUED | OUTPATIENT
Start: 2022-08-23 | End: 2022-08-23 | Stop reason: HOSPADM

## 2022-08-23 RX ADMIN — ACETAMINOPHEN 1000 MG: 500 TABLET ORAL at 15:44

## 2022-08-23 ASSESSMENT — FIBROSIS 4 INDEX: FIB4 SCORE: 0.97

## 2022-08-23 NOTE — ED PROVIDER NOTES
OB ED Eval:    CC: fever    HPI:  Ms. Jarret Vargas is a 32 y.o.  PPD 6 after  here for fever.  Had term IOL with uncomplicated delivery @ 39w5d.  Pt did have an epidural.  Placenta delivered spontaneously intact, pt had a 1st degree laceration.  Discharged home PPD 1 without issues, VS were normal during her stay.      Patient reports that until yesterday, she was taking ibuprofen 3 times daily since discharge.  Had a wet tap with epidural placement and  she was hoping to stave off any signs of posterior also puncture headache.  Stopped ibuprofen yesterday to see how she would do.  Reports that when she stopped the ibuprofen she had fever to 101.  Reports chills every time that she would get up and walk around, otherwise feeling well.  Does report that her child had hand-foot-and-mouth prior to delivery, has been had it last week.  Patient does not have any sore throat or lesions that she knows of in her mouth or on her hands or feet.  Reports mild diarrhea yesterday, none today.  Denies dysuria.  Does report her milk came in study yesterday, denies breast pain.  Mild vaginal bleeding, denies abdominal pain or odor.  Does report she notices was a honeymooning sensation in her left ear, like vibration rather than a true ringing in the ear.  Denies pain in this area.  Endorses maybe a small amount of congestion but denies cough or other URI symptoms.  Does have some cramping and mild back pain but nothing severe.      ROS:  10 system ROS performed and negative except as above in HPI    OB History    Para Term  AB Living   2 2 2 0 0 2   SAB IAB Ectopic Molar Multiple Live Births   0 0 0 0 0 2      # Outcome Date GA Lbr Oz/2nd Weight Sex Delivery Anes PTL Lv   2 Term 22 39w5d / 02:24 2.9 kg (6 lb 6.3 oz) M Vag-Spont EPI N GREY   1 Term 20 39w5d   M Vag-Spont  N GREY       GYN: denies STIs, abnormal Paps  Past medical history: +1 depression, migraine, IBS  Past surgical  "history: Rosman teeth    Meds: Ibuprofen 3 times daily as needed, PNV    Family history: Breast cancer, diabetes, coronary artery disease, hypertension     Social history: Denies alcohol, drugs, tobacco  Allergies: NKDA      PE:  Patient Vitals for the past 6 hrs:   BP Temp Temp src Pulse Height Weight   08/23/22 1513 -- (!) 38.6 °C (101.4 °F) Oral -- -- --   08/23/22 1500 125/73 -- -- (!) 114 1.6 m (5' 3\") 72.6 kg (160 lb)       gen: AAO, NAD  Oropharynx without lesions, edema, or purulence  Ears: tympanic membranes intact bilaterally and normal in appearance, no discharge/erythema  CV: RRR  Resp: ctab  Breasts: nontender, notably full with milk, nontender, no masses noted  abd: soft, NT, fundus below umbilicus; no fundal tenderness  Back: mild ecchymoses around epidural catheter site; nontender, no erythema  Ext: NT, no edema     Latest Reference Range & Units 8/23/22 16:12   WBC 4.8 - 10.8 K/uL 11.4 (H)   RBC 4.20 - 5.40 M/uL 3.17 (L)   Hemoglobin 12.0 - 16.0 g/dL 9.5 (L)   Hematocrit 37.0 - 47.0 % 28.8 (L)   MCV 81.4 - 97.8 fL 90.9   MCH 27.0 - 33.0 pg 30.0   MCHC 33.6 - 35.0 g/dL 33.0 (L)   RDW 35.9 - 50.0 fL 47.8   Platelet Count 164 - 446 K/uL 190   MPV 9.0 - 12.9 fL 9.5   Neutrophils-Polys 44.00 - 72.00 % 39.80 (L)   Neutrophils (Absolute) 2.00 - 7.15 K/uL 4.54   Lymphocytes 22.00 - 41.00 % 59.30 (H)   Lymphs (Absolute) 1.00 - 4.80 K/uL 6.76 (H)   Monocytes 0.00 - 13.40 % 0.90   Monos (Absolute) 0.00 - 0.85 K/uL 0.10   Eosinophils 0.00 - 6.90 % 0.00   Eos (Absolute) 0.00 - 0.51 K/uL 0.00   Basophils 0.00 - 1.80 % 0.00   Baso (Absolute) 0.00 - 0.12 K/uL 0.00   Nucleated RBC /100 WBC 0.20   NRBC (Absolute) K/uL 0.02   Plt Estimation  Normal   RBC Morphology  Present   Anisocytosis  1+   Microcytosis  1+   Polychromia  1+   Smudge Cells  Few   Peripheral Smear Review  see below   Manual Diff Status  PERFORMED   (H): Data is abnormally high  (L): Data is abnormally low   Latest Reference Range & Units 8/23/22 " 16:12   Sodium 135 - 145 mmol/L 141   Potassium 3.6 - 5.5 mmol/L 3.7   Chloride 96 - 112 mmol/L 109   Co2 20 - 33 mmol/L 20   Anion Gap 7.0 - 16.0  12.0   Glucose 65 - 99 mg/dL 92   Bun 8 - 22 mg/dL 8   Creatinine 0.50 - 1.40 mg/dL 0.79   GFR (CKD-EPI) >60 mL/min/1.73 m 2 100   Calcium 8.5 - 10.5 mg/dL 8.5   AST(SGOT) 12 - 45 U/L 18   ALT(SGPT) 2 - 50 U/L 18   Alkaline Phosphatase 30 - 99 U/L 85   Total Bilirubin 0.1 - 1.5 mg/dL 0.2   Albumin 3.2 - 4.9 g/dL 3.7   Total Protein 6.0 - 8.2 g/dL 6.3   Globulin 1.9 - 3.5 g/dL 2.6   A-G Ratio g/dL 1.4      Latest Reference Range & Units 22 16:35   Color  Yellow   Character  Clear   Specific Gravity <1.035  1.012   Ph 5.0 - 8.0  7.5   Glucose Negative mg/dL Negative   Ketones Negative mg/dL Negative   Bilirubin Negative  Negative   Occult Blood Negative  Large !   Protein Negative mg/dL Negative   Nitrite Negative  Negative   Leukocyte Esterase Negative  Small !   Urobilinogen, Urine Negative  0.2   Micro Urine Req  Microscopic   WBC /hpf 10-20 !   RBC /hpf 10-20 !   Epithelial Cells /hpf Moderate !   Bacteria None /hpf Moderate !   !: Data is abnormal     Latest Reference Range & Units 22 16:35   Influenza virus A RNA Negative  Negative   Influenza virus B, PCR Negative  Negative   RSV, PCR Negative  Negative   SARS-CoV-2 by PCR  NotDetected   SARS-CoV-2 Source  NP Swab       A/P: 32 y.o.  PPD6 s/p  with fever  Unclear etiology of fever; pt has had exposure to hand, foot, and mouth.  Also reports mild diarrhea yesterday, currently resolved.  May also have fever from breast engorgement as her milk did come in yesterday, no signs of mastitis.  No signs of PP endometritis.  Patient is clinically well, not persistently tachycardic, mildly tachycardic with fever only.  Very mildly elevated WBC count.  Recommend she continue to monitor her symptoms at home and return if worsening.  Evaluated briefly by Dr. Duong with anesthesia, no concern for epidural  related complication at this time.    F/U in office, return to hospital if worsening.    Linnea Lua MD  [facility]

## 2022-08-23 NOTE — PROGRESS NOTES
"Pt presents to L&D with complaints of fever, chills and ringing in her ears. Pt ambulated to [facility] 5 for assessment.     1440 RN at bedside, pt reports chills and fever starting yesterday. PT states she stopped taking her ibuprofen and the fever presented shortly after that. Pt states that the chills/shakes come on pretty fast but don't last long. Pt states she also had an episode of vertigo a couple of days ago. Pt reports they were concerned that she had an intrathecal epidural and was educated on a spinal HA, pt denies such HA but states she does get a \"fan\" type sound when she sits up in her left ear that resolves when she lays back down. Upon assessment, epidural site slightly tender RN will update Dr. Jeter    1510 Dr. Jeter updated    1525 Dr. Jeter at bedside, POC discussed. Orders received.     1550 Lab at bedside.     1725 Dr. Duong at bedside, POC discussed. Cleared by anesthesia to go home.     1730 Orders for discharge received    1735 RN at bedside, all questions answered. Pt encouraged to return if symptoms worsen.     1740 Pt discharged home in stable condition.     "

## 2022-09-27 ENCOUNTER — APPOINTMENT (OUTPATIENT)
Dept: MEDICAL GROUP | Facility: MEDICAL CENTER | Age: 32
End: 2022-09-27
Payer: COMMERCIAL

## 2022-12-13 ENCOUNTER — TELEMEDICINE (OUTPATIENT)
Dept: MEDICAL GROUP | Facility: MEDICAL CENTER | Age: 32
End: 2022-12-13
Payer: COMMERCIAL

## 2022-12-13 VITALS — TEMPERATURE: 97.1 F | BODY MASS INDEX: 32.32 KG/M2 | HEIGHT: 59 IN

## 2022-12-13 DIAGNOSIS — G89.29 CHRONIC MIDLINE LOW BACK PAIN, UNSPECIFIED WHETHER SCIATICA PRESENT: ICD-10-CM

## 2022-12-13 DIAGNOSIS — M25.559 HIP PAIN: ICD-10-CM

## 2022-12-13 DIAGNOSIS — M54.50 CHRONIC MIDLINE LOW BACK PAIN, UNSPECIFIED WHETHER SCIATICA PRESENT: ICD-10-CM

## 2022-12-13 PROCEDURE — 99214 OFFICE O/P EST MOD 30 MIN: CPT | Mod: 95 | Performed by: PHYSICIAN ASSISTANT

## 2022-12-13 NOTE — PROGRESS NOTES
"This visit was conducted utilizing secure and encrypted videoconferencing equipment, with the patient's consent.    Patient's identity was verified.          Chief Complaint   Patient presents with    Back Pain     R, Lower near hip/buttock area, radiates down leg x 2 days       HPI:     Jarret Vargas is a 32 y.o. female. Patient is presenting to discuss: new onset Hip pain and back pain postpartum.    Patient had a baby about 4 months ago, since then she has been having pain mostly on the left side but occasionally on the right side as well.  Also has midline lower back pain, sometimes radiating down the leg.  Also gets pain over upper back sometimes.  Patient is a still breast-feeding 8 times a day and she has been delivered when she is breast-feeding.  Lower back pain has been radiating down the leg for the past 2 weeks, no numbness or weakness        Past medical, surgical, family, and social history is reviewed in Epic chart by me today.   Medications and allergies reviewed and updated in Epic chart by me today.          Objective:     Temp 36.2 °C (97.1 °F)   Ht 1.499 m (4' 11\")  Body mass index is 32.32 kg/m².   Physical Exam:  Pain: sounds comfortable   Constitutional: Alert, no distress.  Eye: pupils Equal, conjunctiva clear,   Respiratory: Unlabored respiratory effort, speaking in full sentences, no audible wheezes.           Assessment and Plan:   The following treatment plan was discussed      1. Hip pain  SI joint is a possibility versus other.  - Referral to Pain Clinic  - diclofenac sodium (VOLTAREN) 1 % Gel; Apply 2 g topically 3 times a day as needed (hip pain).  Dispense: 150 g; Refill: 3    2. Chronic midline low back pain, unspecified whether sciatica present    - Referral to Pain Clinic  - diclofenac sodium (VOLTAREN) 1 % Gel; Apply 2 g topically 3 times a day as needed (hip pain).  Dispense: 150 g; Refill: 3        Discussed exercising, stretching, absent lower back " exercises      F/U prn

## 2023-10-25 ENCOUNTER — OFFICE VISIT (OUTPATIENT)
Dept: MEDICAL GROUP | Facility: MEDICAL CENTER | Age: 33
End: 2023-10-25
Payer: COMMERCIAL

## 2023-10-25 VITALS
BODY MASS INDEX: 27.09 KG/M2 | HEIGHT: 59 IN | SYSTOLIC BLOOD PRESSURE: 100 MMHG | HEART RATE: 95 BPM | OXYGEN SATURATION: 95 % | DIASTOLIC BLOOD PRESSURE: 66 MMHG | WEIGHT: 134.4 LBS | TEMPERATURE: 98.1 F

## 2023-10-25 DIAGNOSIS — R11.0 NAUSEA: ICD-10-CM

## 2023-10-25 DIAGNOSIS — R42 DIZZINESS: ICD-10-CM

## 2023-10-25 LAB
POCT INT CON NEG: NEGATIVE
POCT INT CON POS: POSITIVE
POCT URINE PREGNANCY TEST: NEGATIVE

## 2023-10-25 PROCEDURE — 81025 URINE PREGNANCY TEST: CPT | Performed by: STUDENT IN AN ORGANIZED HEALTH CARE EDUCATION/TRAINING PROGRAM

## 2023-10-25 PROCEDURE — 3078F DIAST BP <80 MM HG: CPT | Performed by: STUDENT IN AN ORGANIZED HEALTH CARE EDUCATION/TRAINING PROGRAM

## 2023-10-25 PROCEDURE — 99213 OFFICE O/P EST LOW 20 MIN: CPT | Performed by: STUDENT IN AN ORGANIZED HEALTH CARE EDUCATION/TRAINING PROGRAM

## 2023-10-25 PROCEDURE — 3074F SYST BP LT 130 MM HG: CPT | Performed by: STUDENT IN AN ORGANIZED HEALTH CARE EDUCATION/TRAINING PROGRAM

## 2023-10-25 RX ORDER — ONDANSETRON 4 MG/1
4 TABLET, FILM COATED ORAL EVERY 6 HOURS PRN
Qty: 20 TABLET | Refills: 0 | Status: SHIPPED | OUTPATIENT
Start: 2023-10-25 | End: 2023-10-30

## 2023-10-25 ASSESSMENT — ENCOUNTER SYMPTOMS
DIZZINESS: 1
FEVER: 0
SORE THROAT: 0
CONSTIPATION: 0
FOCAL WEAKNESS: 0
ABDOMINAL PAIN: 0
CHILLS: 0
COUGH: 0
VOMITING: 0
SHORTNESS OF BREATH: 1
HEADACHES: 1
NAUSEA: 1
DIARRHEA: 0
DIAPHORESIS: 1

## 2023-10-25 ASSESSMENT — FIBROSIS 4 INDEX: FIB4 SCORE: 0.78

## 2023-10-25 ASSESSMENT — PATIENT HEALTH QUESTIONNAIRE - PHQ9: CLINICAL INTERPRETATION OF PHQ2 SCORE: 0

## 2023-10-25 NOTE — PROGRESS NOTES
"Subjective:     CC: dizziness, nausea.    HPI:   Jarret presents today with    Reports dizziness, lightheadedness, shakiness, nausea for 5 days. Everything is spinning, feeling like going down on roller coaster. Intermittent. Lasts couple hours. Over 5 days had 3 episodes. First episode was after drinking slushy, pumpkin. 2nd episode does not remember. Today it happened after drinking coffee in the morning.   Last menstrual period on 10/5/23 was regular. Home pregnancy test negative this morning.    ROS:  Review of Systems   Constitutional:  Positive for diaphoresis. Negative for chills and fever.   HENT:  Positive for tinnitus (left). Negative for congestion, ear pain, hearing loss and sore throat.    Respiratory:  Positive for shortness of breath. Negative for cough.    Cardiovascular:  Negative for chest pain and leg swelling.   Gastrointestinal:  Positive for nausea. Negative for abdominal pain, constipation, diarrhea and vomiting.   Neurological:  Positive for dizziness and headaches. Negative for focal weakness.       Objective:     Exam:  /66 (BP Location: Left arm, Patient Position: Sitting, BP Cuff Size: Adult)   Pulse 95   Temp 36.7 °C (98.1 °F) (Temporal)   Ht 1.499 m (4' 11\")   Wt 61 kg (134 lb 6.4 oz)   LMP 10/12/2023   SpO2 95%   BMI 27.15 kg/m²  Body mass index is 27.15 kg/m².    Physical Exam  Vitals reviewed.   HENT:      Head: Normocephalic.      Mouth/Throat:      Mouth: Mucous membranes are moist.      Pharynx: Oropharynx is clear.   Eyes:      Pupils: Pupils are equal, round, and reactive to light.   Cardiovascular:      Rate and Rhythm: Normal rate and regular rhythm.   Pulmonary:      Effort: Pulmonary effort is normal. No respiratory distress.      Breath sounds: No wheezing or rales.   Abdominal:      General: Abdomen is flat. Bowel sounds are normal. There is no distension.      Tenderness: There is no abdominal tenderness. There is no guarding.   Skin:     General: Skin is " warm.   Neurological:      General: No focal deficit present.      Mental Status: She is alert and oriented to person, place, and time.       Labs: ordered    Assessment & Plan:     33 y.o. female with the following -     1. Nausea  - POCT Pregnancy negative  - CBC WITH DIFFERENTIAL; Future  - Comp Metabolic Panel; Future  - ondansetron (ZOFRAN) 4 MG Tab tablet; Take 1 Tablet by mouth every 6 hours as needed for Nausea/Vomiting for up to 5 days.  Dispense: 20 Tablet; Refill: 0    2. Dizziness  2 out 3 episodes of dizziness was associated with sweat drinks.   - HEMOGLOBIN A1C; Future      Follow up with PCP Alicia.    Please note that this dictation was created using voice recognition software. I have made every reasonable attempt to correct obvious errors, but I expect that there are errors of grammar and possibly content that I did not discover before finalizing the note.

## 2024-07-20 ENCOUNTER — APPOINTMENT (OUTPATIENT)
Dept: URGENT CARE | Facility: CLINIC | Age: 34
End: 2024-07-20

## 2025-03-18 ENCOUNTER — APPOINTMENT (OUTPATIENT)
Dept: RADIOLOGY | Facility: MEDICAL CENTER | Age: 35
End: 2025-03-18
Attending: EMERGENCY MEDICINE
Payer: COMMERCIAL

## 2025-03-18 ENCOUNTER — HOSPITAL ENCOUNTER (EMERGENCY)
Facility: MEDICAL CENTER | Age: 35
End: 2025-03-18
Attending: EMERGENCY MEDICINE
Payer: COMMERCIAL

## 2025-03-18 VITALS
HEART RATE: 100 BPM | RESPIRATION RATE: 16 BRPM | BODY MASS INDEX: 28.67 KG/M2 | HEIGHT: 59 IN | TEMPERATURE: 98.3 F | DIASTOLIC BLOOD PRESSURE: 69 MMHG | SYSTOLIC BLOOD PRESSURE: 119 MMHG | WEIGHT: 142.2 LBS | OXYGEN SATURATION: 97 %

## 2025-03-18 DIAGNOSIS — O26.891 ABDOMINAL PAIN DURING PREGNANCY IN FIRST TRIMESTER: ICD-10-CM

## 2025-03-18 DIAGNOSIS — R10.9 ABDOMINAL PAIN DURING PREGNANCY IN FIRST TRIMESTER: ICD-10-CM

## 2025-03-18 LAB
ALBUMIN SERPL BCP-MCNC: 4.4 G/DL (ref 3.2–4.9)
ALBUMIN/GLOB SERPL: 1.4 G/DL
ALP SERPL-CCNC: 61 U/L (ref 30–99)
ALT SERPL-CCNC: 15 U/L (ref 2–50)
ANION GAP SERPL CALC-SCNC: 11 MMOL/L (ref 7–16)
APPEARANCE UR: CLEAR
AST SERPL-CCNC: 26 U/L (ref 12–45)
B-HCG SERPL-ACNC: 315 MIU/ML (ref 0–5)
BACTERIA #/AREA URNS HPF: ABNORMAL /HPF
BASOPHILS # BLD AUTO: 0.4 % (ref 0–1.8)
BASOPHILS # BLD: 0.04 K/UL (ref 0–0.12)
BILIRUB SERPL-MCNC: 0.4 MG/DL (ref 0.1–1.5)
BILIRUB UR QL STRIP.AUTO: NEGATIVE
BUN SERPL-MCNC: 9 MG/DL (ref 8–22)
CALCIUM ALBUM COR SERPL-MCNC: 9 MG/DL (ref 8.5–10.5)
CALCIUM SERPL-MCNC: 9.3 MG/DL (ref 8.5–10.5)
CASTS URNS QL MICRO: ABNORMAL /LPF (ref 0–2)
CHLORIDE SERPL-SCNC: 102 MMOL/L (ref 96–112)
CO2 SERPL-SCNC: 21 MMOL/L (ref 20–33)
COLOR UR: YELLOW
CREAT SERPL-MCNC: 0.8 MG/DL (ref 0.5–1.4)
EOSINOPHIL # BLD AUTO: 0.02 K/UL (ref 0–0.51)
EOSINOPHIL NFR BLD: 0.2 % (ref 0–6.9)
EPITHELIAL CELLS 1715: ABNORMAL /HPF (ref 0–5)
ERYTHROCYTE [DISTWIDTH] IN BLOOD BY AUTOMATED COUNT: 41.9 FL (ref 35.9–50)
GFR SERPLBLD CREATININE-BSD FMLA CKD-EPI: 97 ML/MIN/1.73 M 2
GLOBULIN SER CALC-MCNC: 3.1 G/DL (ref 1.9–3.5)
GLUCOSE SERPL-MCNC: 106 MG/DL (ref 65–99)
GLUCOSE UR STRIP.AUTO-MCNC: NEGATIVE MG/DL
HCT VFR BLD AUTO: 42.2 % (ref 37–47)
HGB BLD-MCNC: 14.2 G/DL (ref 12–16)
IMM GRANULOCYTES # BLD AUTO: 0.03 K/UL (ref 0–0.11)
IMM GRANULOCYTES NFR BLD AUTO: 0.3 % (ref 0–0.9)
KETONES UR STRIP.AUTO-MCNC: 15 MG/DL
LEUKOCYTE ESTERASE UR QL STRIP.AUTO: ABNORMAL
LIPASE SERPL-CCNC: 31 U/L (ref 11–82)
LYMPHOCYTES # BLD AUTO: 2.3 K/UL (ref 1–4.8)
LYMPHOCYTES NFR BLD: 24 % (ref 22–41)
MCH RBC QN AUTO: 30.5 PG (ref 27–33)
MCHC RBC AUTO-ENTMCNC: 33.6 G/DL (ref 32.2–35.5)
MCV RBC AUTO: 90.8 FL (ref 81.4–97.8)
MICRO URNS: ABNORMAL
MONOCYTES # BLD AUTO: 0.51 K/UL (ref 0–0.85)
MONOCYTES NFR BLD AUTO: 5.3 % (ref 0–13.4)
NEUTROPHILS # BLD AUTO: 6.69 K/UL (ref 1.82–7.42)
NEUTROPHILS NFR BLD: 69.8 % (ref 44–72)
NITRITE UR QL STRIP.AUTO: NEGATIVE
NRBC # BLD AUTO: 0 K/UL
NRBC BLD-RTO: 0 /100 WBC (ref 0–0.2)
NUMBER OF RH DOSES IND 8505RD: NORMAL
PH UR STRIP.AUTO: 6.5 [PH] (ref 5–8)
PLATELET # BLD AUTO: 265 K/UL (ref 164–446)
PMV BLD AUTO: 10.5 FL (ref 9–12.9)
POTASSIUM SERPL-SCNC: 4.4 MMOL/L (ref 3.6–5.5)
PROT SERPL-MCNC: 7.5 G/DL (ref 6–8.2)
PROT UR QL STRIP: NEGATIVE MG/DL
RBC # BLD AUTO: 4.65 M/UL (ref 4.2–5.4)
RBC # URNS HPF: ABNORMAL /HPF (ref 0–2)
RBC UR QL AUTO: NEGATIVE
RH BLD: NORMAL
SODIUM SERPL-SCNC: 134 MMOL/L (ref 135–145)
SP GR UR STRIP.AUTO: 1.02
UROBILINOGEN UR STRIP.AUTO-MCNC: 0.2 EU/DL
WBC # BLD AUTO: 9.6 K/UL (ref 4.8–10.8)
WBC #/AREA URNS HPF: ABNORMAL /HPF

## 2025-03-18 PROCEDURE — 86901 BLOOD TYPING SEROLOGIC RH(D): CPT

## 2025-03-18 PROCEDURE — 76817 TRANSVAGINAL US OBSTETRIC: CPT

## 2025-03-18 PROCEDURE — 76775 US EXAM ABDO BACK WALL LIM: CPT

## 2025-03-18 PROCEDURE — 84702 CHORIONIC GONADOTROPIN TEST: CPT

## 2025-03-18 PROCEDURE — 83690 ASSAY OF LIPASE: CPT

## 2025-03-18 PROCEDURE — 85025 COMPLETE CBC W/AUTO DIFF WBC: CPT

## 2025-03-18 PROCEDURE — 36415 COLL VENOUS BLD VENIPUNCTURE: CPT

## 2025-03-18 PROCEDURE — 80053 COMPREHEN METABOLIC PANEL: CPT

## 2025-03-18 PROCEDURE — 81001 URINALYSIS AUTO W/SCOPE: CPT

## 2025-03-18 PROCEDURE — 99284 EMERGENCY DEPT VISIT MOD MDM: CPT

## 2025-03-18 RX ORDER — SODIUM CHLORIDE, SODIUM LACTATE, POTASSIUM CHLORIDE, AND CALCIUM CHLORIDE .6; .31; .03; .02 G/100ML; G/100ML; G/100ML; G/100ML
1000 INJECTION, SOLUTION INTRAVENOUS ONCE
Status: DISCONTINUED | OUTPATIENT
Start: 2025-03-18 | End: 2025-03-18 | Stop reason: HOSPADM

## 2025-03-18 RX ORDER — ACETAMINOPHEN 10 MG/ML
1000 INJECTION, SOLUTION INTRAVENOUS ONCE
Status: DISCONTINUED | OUTPATIENT
Start: 2025-03-18 | End: 2025-03-18 | Stop reason: HOSPADM

## 2025-03-18 ASSESSMENT — PAIN DESCRIPTION - PAIN TYPE: TYPE: ACUTE PAIN

## 2025-03-18 NOTE — ED NOTES
Pt ambulated to restroom for urine sample.   Pt requesting oral tylenol instead of IV, will discuss with ERP.

## 2025-03-18 NOTE — ED PROVIDER NOTES
CHIEF COMPLAINT  Chief Complaint   Patient presents with    Abdominal Pain     Pt reports approx 5 weeks pregnant. Pt reports she started having left lower abdominal cramping that radiates to low back. Denies bleeding.        LIMITATION TO HISTORY   Select: none    HPI    Jarret Vargas is a 35 y.o. female who is 5 week pregnant presents to the Emergency Department for evaluation of left sided abdominal pain that radiates to her lower back. The patient notes this is not her first pregnancy and she is . The patient describes the pain as a cramping pain.  Patient denies any fevers chills nausea vomiting denies any dysuria.      OUTSIDE HISTORIAN(S):  Select:  was present at bedside.    EXTERNAL RECORDS REVIEWED  Select: External ED Note: patient's last office visit was at [facility] where she was being evaluated for nausea and dizziness.    PAST MEDICAL HISTORY  No pertinent past medical history on file.  .    SURGICAL HISTORY  Past Surgical History:   Procedure Laterality Date    DENTAL EXTRACTION(S)      [place]         FAMILY HISTORY  Family History   Problem Relation Age of Onset    Hypertension Mother     Arthritis Mother         carpal tunnel    GI Disease Father         diverticulitis    Arthritis Father         gout    Psychiatric Illness Brother     Heart Disease Maternal Grandmother     Hypertension Maternal Grandmother     Hyperlipidemia Maternal Grandmother     Heart Disease Maternal Grandfather     Cancer Maternal Grandfather         colon    Dementia Paternal Grandmother           SOCIAL HISTORY  Social History     Tobacco Use    Smoking status: Never    Smokeless tobacco: Never   Vaping Use    Vaping status: Never Used   Substance Use Topics    Alcohol use: Not Currently     Comment: few nights a week    Drug use: No         CURRENT MEDICATIONS  No current facility-administered medications on file prior to encounter.     No current outpatient medications on file  "prior to encounter.         ALLERGIES  Allergies   Allergen Reactions    Other Misc      Cats       PHYSICAL EXAM  VITAL SIGNS:BP (!) 138/93   Pulse (!) 115   Temp 36.8 °C (98.2 °F) (Temporal)   Resp 18   Ht 1.499 m (4' 11\")   Wt 64.5 kg (142 lb 3.2 oz)   LMP 02/10/2025 (Approximate)   SpO2 99%   BMI 28.72 kg/m²       Constitutional: Well-developed no acute distress   HENT: Normocephalic, Atraumatic, Bilateral external ears normal.  Eyes:  conjunctiva are normal.   Neck: Supple.  Nontender midline  Cardiovascular: Regular rate and rhythm without murmurs gallops or rubs.   Thorax & Lungs: No respiratory distress. Breathing comfortably. Lungs are clear to auscultation bilaterally, there are no wheezes no rales. Chest wall is nontender.  Abdomen: Soft, non distended, tenderness in left lower quadrant, no rebound tenderness.  Skin: Warm, Dry, No erythema,   Back: No point tenderness. No tenderness, No CVA tenderness.  Musculoskeletal: No clubbing cyanosis or edema good range of motion   Neurologic: Alert & oriented x 3, normal sensation moving all extremities appears normal   Psychiatric: Affect normal, Judgment normal, Mood normal.       DIAGNOSTIC STUDIES / PROCEDURES      LABS  Results for orders placed or performed during the hospital encounter of 03/18/25   CBC WITH DIFFERENTIAL    Collection Time: 03/18/25 12:39 PM   Result Value Ref Range    WBC 9.6 4.8 - 10.8 K/uL    RBC 4.65 4.20 - 5.40 M/uL    Hemoglobin 14.2 12.0 - 16.0 g/dL    Hematocrit 42.2 37.0 - 47.0 %    MCV 90.8 81.4 - 97.8 fL    MCH 30.5 27.0 - 33.0 pg    MCHC 33.6 32.2 - 35.5 g/dL    RDW 41.9 35.9 - 50.0 fL    Platelet Count 265 164 - 446 K/uL    MPV 10.5 9.0 - 12.9 fL    Neutrophils-Polys 69.80 44.00 - 72.00 %    Lymphocytes 24.00 22.00 - 41.00 %    Monocytes 5.30 0.00 - 13.40 %    Eosinophils 0.20 0.00 - 6.90 %    Basophils 0.40 0.00 - 1.80 %    Immature Granulocytes 0.30 0.00 - 0.90 %    Nucleated RBC 0.00 0.00 - 0.20 /100 WBC    " Neutrophils (Absolute) 6.69 1.82 - 7.42 K/uL    Lymphs (Absolute) 2.30 1.00 - 4.80 K/uL    Monos (Absolute) 0.51 0.00 - 0.85 K/uL    Eos (Absolute) 0.02 0.00 - 0.51 K/uL    Baso (Absolute) 0.04 0.00 - 0.12 K/uL    Immature Granulocytes (abs) 0.03 0.00 - 0.11 K/uL    NRBC (Absolute) 0.00 K/uL   COMP METABOLIC PANEL    Collection Time: 03/18/25 12:39 PM   Result Value Ref Range    Sodium 134 (L) 135 - 145 mmol/L    Potassium 4.4 3.6 - 5.5 mmol/L    Chloride 102 96 - 112 mmol/L    Co2 21 20 - 33 mmol/L    Anion Gap 11.0 7.0 - 16.0    Glucose 106 (H) 65 - 99 mg/dL    Bun 9 8 - 22 mg/dL    Creatinine 0.80 0.50 - 1.40 mg/dL    Calcium 9.3 8.5 - 10.5 mg/dL    Correct Calcium 9.0 8.5 - 10.5 mg/dL    AST(SGOT) 26 12 - 45 U/L    ALT(SGPT) 15 2 - 50 U/L    Alkaline Phosphatase 61 30 - 99 U/L    Total Bilirubin 0.4 0.1 - 1.5 mg/dL    Albumin 4.4 3.2 - 4.9 g/dL    Total Protein 7.5 6.0 - 8.2 g/dL    Globulin 3.1 1.9 - 3.5 g/dL    A-G Ratio 1.4 g/dL   LIPASE    Collection Time: 03/18/25 12:39 PM   Result Value Ref Range    Lipase 31 11 - 82 U/L   HCG QUANTITATIVE    Collection Time: 03/18/25 12:39 PM   Result Value Ref Range    Bhcg 315.0 (H) 0.0 - 5.0 mIU/mL   RH Type for Rhogam from E.D.    Collection Time: 03/18/25 12:39 PM   Result Value Ref Range    Emergency Department Rh Typing POS    ESTIMATED GFR    Collection Time: 03/18/25 12:39 PM   Result Value Ref Range    GFR (CKD-EPI) 97 >60 mL/min/1.73 m 2   URINALYSIS,CULTURE IF INDICATED    Collection Time: 03/18/25  2:00 PM    Specimen: Urine   Result Value Ref Range    Color Yellow     Character Clear     Specific Gravity 1.018 <1.035    Ph 6.5 5.0 - 8.0    Glucose Negative Negative mg/dL    Ketones 15 (A) Negative mg/dL    Protein Negative Negative mg/dL    Bilirubin Negative Negative    Urobilinogen, Urine 0.2 <=1.0 EU/dL    Nitrite Negative Negative    Leukocyte Esterase Trace (A) Negative    Occult Blood Negative Negative    Micro Urine Req Microscopic    URINE  MICROSCOPIC (W/UA)    Collection Time: 03/18/25  2:00 PM   Result Value Ref Range    WBC 0-2 /hpf    RBC 3-5 (A) 0 - 2 /hpf    Bacteria None Seen None /hpf    Epithelial Cells 0-2 0 - 5 /hpf    Urine Casts 0-2 0 - 2 /lpf       RADIOLOGY  I have independently interpreted the diagnostic imaging associated with this visit and am waiting the final reading from the radiologist.   My preliminary interpretation is as follows: Ultrasound does not reveal an IUP      Radiologist interpretation:   US-RENAL   Final Result      Normal renal ultrasound.      US-OB 1ST TRIMESTER WITH TRANSVAGINAL (COMBO)   Final Result      1. No intrauterine pregnancy.   2. No free fluid in the pelvis.   3. Endometrial reaction in the upper uterine segment with heterogeneous echogenic fluid in the upper uterine segment endometrial cavity, likely breakdown products of hemorrhage.   4. Ectopic pregnancy is not excluded. Sonographic imaging follow-up and correlation with serial serum beta-hCG levels is recommended.      Findings were conveyed to the ordering physician at the time of this interpretation on 3/18/2025.           COURSE & MEDICAL DECISION MAKING    ED COURSE:    ED Observation Status? No, The patient does not qualify for observation status     INTERVENTIONS BY ME:  Medications - No data to display      12:56 PM - Patient seen and examined at bedside. Discussed plan of care, including labs diagnostic labs and imaging. Patient agrees to the plan of care. The patient will be medicated with Bolus infusion 1,000 mL and acetaminophen injection 1,000 mg. Ordered for US- renal, US OB 1st trimester w transvaginal, CBC w diff, CMP, lipase, HCG quantitative, urinalysis culture if indicated, RH Type for Rhogam to evaluate her symptoms.     2:00 PM - Ordered for Urine microscopic with urinalysis.    3:16 PM - Patient was reevaluated at bedside. Discussed lab and radiology results with the patient. I discussed the patient's diagnostic study results  which show no acute abnormalities. I discussed plan for discharge and follow up as outlined below. The patient is stable for discharge at this time and will return for any new or worsening symptoms. Patient verbalizes understanding and support with my plan for discharge.       INITIAL ASSESSMENT, COURSE AND PLAN  Care Narrative: Patient presents emerged part for for evaluation.  Clinically patient does have some tenderness.  Urinalysis is normal so there is no blood within the urine ultrasound of the kidney showed no signs of hydronephrosis her pain is not from a kidney stone.  Pelvic ultrasound does not show an IUP and her beta-hCG is only 315.  LFTs and electrolytes are essentially in the normal range there is nonspecific changes to her sodium of 134.  At this point the concern would be for an ectopic pregnancy.  I recommended for the patient to return in 2 days for repeat beta hCG or follow-up with Dr. Desir who is her gynecologist if she can get in.  At this point I have recommended for her to return earlier if there is any worsening symptoms.  Patient understands to return in 48 hours for repeat beta-hCG for the possibility of an ectopic pregnancy or earlier if there is any worsening symptoms.    HYDRATION: Based on the patient's presentation of abdominal pain,  the patient was given IV fluids. IV Hydration was used because oral hydration is unable to be done due to the patient's symptoms. Upon recheck following hydration, the patient was feeling improved.     ADDITIONAL PROBLEM LIST  None    DISPOSITION AND DISCUSSIONS  I have discussed management of the patient with the following physicians and DENISE's: None    Escalation of care considered, and ultimately not performed: None    Barriers to care at this time, including but not limited to: None.     Decision tools and prescription drugs considered including, but not limited to: As described above.       The patient will return for new or worsening symptoms and  is stable at the time of discharge.      DISPOSITION:  Patient will be discharged home in stable condition.    FOLLOW UP:  Grecia Pena M.D.  [address] [place] [address]  [phone]    Schedule an appointment as soon as possible for a visit in 2 days  For re-check or return to the ED for repeat blood test and possible US      FINAL DIAGNOSIS  1. Abdominal pain during pregnancy in first trimester         INaz (Willa), am scribing for, and in the presence of, Mohit Law M.D..    Electronically signed by: Naz Almanza (Willa), 3/18/2025    IMohit M.D. personally performed the services described in this documentation, as scribed by Naz Almanza in my presence, and it is both accurate and complete.     Electronically signed by: Mohit Law M.D.,8:07 PM 03/18/25

## 2025-03-18 NOTE — DISCHARGE INSTRUCTIONS
Return in 48 hours for recheck beta-hCG or follow-up with your obstetrician in 48 hours.  If any symptoms worsen return back to the emergency department for reevaluation.

## 2025-03-18 NOTE — ED TRIAGE NOTES
"Chief Complaint   Patient presents with    Abdominal Pain     Pt reports approx 5 weeks pregnant. Pt reports she started having left lower abdominal cramping that radiates to low back. Denies bleeding.        36 yo F to triage for above complaint. Abdominal pain protocol ordered.      Pt placed in lobby. Pt educated on triage process. Pt encouraged to alert staff for any changes.     Patient and staff wearing appropriate PPE    BP (!) 138/93   Pulse (!) 115   Temp 36.8 °C (98.2 °F) (Temporal)   Resp 18   Ht 1.499 m (4' 11\")   Wt 64.5 kg (142 lb 3.2 oz)   LMP 02/10/2025 (Approximate)   SpO2 99%   BMI 28.72 kg/m²     "

## 2025-03-18 NOTE — ED NOTES
Patient discharged home per ERP. Discharge teaching, education, and POC discussed with patient who verbalizes understanding. Patient instructed to return to the ER if symptoms worsen. No other questions at this time.    Vitals are stable. Patient alert and oriented.  Pt ambulated off unit safely.